# Patient Record
Sex: FEMALE | Race: WHITE | URBAN - METROPOLITAN AREA
[De-identification: names, ages, dates, MRNs, and addresses within clinical notes are randomized per-mention and may not be internally consistent; named-entity substitution may affect disease eponyms.]

---

## 2021-07-29 ENCOUNTER — OFFICE VISIT (OUTPATIENT)
Dept: PRIMARY CARE CLINIC | Age: 61
End: 2021-07-29
Payer: COMMERCIAL

## 2021-07-29 VITALS
OXYGEN SATURATION: 98 % | WEIGHT: 183.2 LBS | HEART RATE: 66 BPM | SYSTOLIC BLOOD PRESSURE: 106 MMHG | TEMPERATURE: 98.2 F | DIASTOLIC BLOOD PRESSURE: 70 MMHG | BODY MASS INDEX: 27.77 KG/M2 | HEIGHT: 68 IN

## 2021-07-29 DIAGNOSIS — I47.1 PSVT (PAROXYSMAL SUPRAVENTRICULAR TACHYCARDIA) (HCC): ICD-10-CM

## 2021-07-29 DIAGNOSIS — M21.612 BUNION OF GREAT TOE OF LEFT FOOT: ICD-10-CM

## 2021-07-29 DIAGNOSIS — Z00.00 ROUTINE ADULT HEALTH MAINTENANCE: ICD-10-CM

## 2021-07-29 DIAGNOSIS — R21 RASH: Primary | ICD-10-CM

## 2021-07-29 DIAGNOSIS — C50.911 MALIGNANT NEOPLASM OF RIGHT BREAST IN FEMALE, ESTROGEN RECEPTOR POSITIVE, UNSPECIFIED SITE OF BREAST (HCC): ICD-10-CM

## 2021-07-29 DIAGNOSIS — E55.9 VITAMIN D DEFICIENCY: ICD-10-CM

## 2021-07-29 DIAGNOSIS — Z17.0 MALIGNANT NEOPLASM OF RIGHT BREAST IN FEMALE, ESTROGEN RECEPTOR POSITIVE, UNSPECIFIED SITE OF BREAST (HCC): ICD-10-CM

## 2021-07-29 PROCEDURE — 99203 OFFICE O/P NEW LOW 30 MIN: CPT | Performed by: INTERNAL MEDICINE

## 2021-07-29 RX ORDER — METOPROLOL SUCCINATE 25 MG/1
25 TABLET, EXTENDED RELEASE ORAL DAILY
COMMUNITY
Start: 2020-10-08 | End: 2021-08-03 | Stop reason: SDUPTHER

## 2021-07-29 RX ORDER — CETIRIZINE HYDROCHLORIDE 10 MG/1
10 TABLET ORAL DAILY
COMMUNITY

## 2021-07-29 RX ORDER — CLOBETASOL PROPIONATE 0.5 MG/G
OINTMENT TOPICAL 2 TIMES DAILY
COMMUNITY

## 2021-07-29 RX ORDER — MECLIZINE HYDROCHLORIDE 25 MG/1
25 TABLET ORAL 3 TIMES DAILY PRN
COMMUNITY
Start: 2021-07-21

## 2021-07-29 RX ORDER — ONDANSETRON 4 MG/1
4 TABLET, FILM COATED ORAL EVERY 8 HOURS PRN
COMMUNITY
Start: 2021-02-14

## 2021-07-29 SDOH — ECONOMIC STABILITY: FOOD INSECURITY: WITHIN THE PAST 12 MONTHS, YOU WORRIED THAT YOUR FOOD WOULD RUN OUT BEFORE YOU GOT MONEY TO BUY MORE.: NEVER TRUE

## 2021-07-29 SDOH — ECONOMIC STABILITY: FOOD INSECURITY: WITHIN THE PAST 12 MONTHS, THE FOOD YOU BOUGHT JUST DIDN'T LAST AND YOU DIDN'T HAVE MONEY TO GET MORE.: NEVER TRUE

## 2021-07-29 ASSESSMENT — PATIENT HEALTH QUESTIONNAIRE - PHQ9
1. LITTLE INTEREST OR PLEASURE IN DOING THINGS: 0
2. FEELING DOWN, DEPRESSED OR HOPELESS: 0
SUM OF ALL RESPONSES TO PHQ QUESTIONS 1-9: 0
SUM OF ALL RESPONSES TO PHQ QUESTIONS 1-9: 0
SUM OF ALL RESPONSES TO PHQ9 QUESTIONS 1 & 2: 0
SUM OF ALL RESPONSES TO PHQ QUESTIONS 1-9: 0

## 2021-07-29 ASSESSMENT — SOCIAL DETERMINANTS OF HEALTH (SDOH): HOW HARD IS IT FOR YOU TO PAY FOR THE VERY BASICS LIKE FOOD, HOUSING, MEDICAL CARE, AND HEATING?: NOT HARD AT ALL

## 2021-07-29 NOTE — PATIENT INSTRUCTIONS
1. Explain to your dermatologist that you really need confidence before undertaking course of treatment, therefore w/u for contact dermatitis, KOH prep, fungal culture would be reasonable.      2. Oil of olay or dove unscented soap for soap, in wintertime moisturizing creams are better than lotions, eg Cerave cream.     3. Derm and [facility] referrals created

## 2021-08-03 NOTE — TELEPHONE ENCOUNTER
[address] [facility] [address] Clinical Staff  Subject: Message to Provider     QUESTIONS   Information for Provider? Pt appointment for her new cardiologist isn't until the end of September and her medication runs out in a week.  The pt is requesting Dr. Drake Rubin to refill this medication for 60 days until   she's able to see her new cardiologist. metoprolol succinate (TOPROL XL)   25 MG extended release tablet

## 2021-08-04 RX ORDER — METOPROLOL SUCCINATE 25 MG/1
25 TABLET, EXTENDED RELEASE ORAL DAILY
Qty: 30 TABLET | Refills: 1 | Status: SHIPPED | OUTPATIENT
Start: 2021-08-04

## 2021-08-04 NOTE — TELEPHONE ENCOUNTER
Patient checking the status of metoprolol succinate (TOPROL XL)   25 MG extended release tablet  prescription requested.   Please Advise

## 2021-08-16 ENCOUNTER — TELEPHONE (OUTPATIENT)
Dept: PRIMARY CARE CLINIC | Age: 61
End: 2021-08-16

## 2021-08-16 NOTE — TELEPHONE ENCOUNTER
----- Message from Charlygwen Morataya sent at 8/16/2021  7:57 AM EDT -----  Subject: Appointment Request    Reason for Call: Routine Existing Condition Follow Up    QUESTIONS  Type of Appointment? Established Patient  Reason for appointment request? No appointments available during search  Additional Information for Provider? Pt cancelled their appointment today   for 3 week f/u discuss labs please call to reschedule.  ---------------------------------------------------------------------------  --------------  [address]  What is the best way for the office to contact you? OK to leave message on   voicemail  Preferred Call Back Phone Number? [phone]  ---------------------------------------------------------------------------  --------------  SCRIPT ANSWERS  Relationship to Patient? Self  (Is the patient requesting to be seen urgently for their symptoms?)? No  Is this follow up request related to routine Diabetes Management? No  Are you having any new concerns about your existing condition? No  Have you been diagnosed with, awaiting test results for, or told that you   are suspected of having COVID-19 (Coronavirus)? (If patient has tested   negative or was tested as a requirement for work, school, or travel and   not based on symptoms, answer no)? No  Do you currently have flu-like symptoms including fever or chills, cough,   shortness of breath, difficulty breathing, or new loss of taste or smell? No  Have you had close contact with someone with COVID-19 in the last 14 days? No  (Service Expert  click yes below to proceed with [employer] As Usual   Scheduling)?  Yes

## 2024-01-11 ENCOUNTER — OFFICE VISIT (OUTPATIENT)
Dept: PRIMARY CARE CLINIC | Age: 64
End: 2024-01-11

## 2024-01-11 VITALS
TEMPERATURE: 97.6 F | RESPIRATION RATE: 16 BRPM | WEIGHT: 185 LBS | DIASTOLIC BLOOD PRESSURE: 76 MMHG | BODY MASS INDEX: 27.4 KG/M2 | OXYGEN SATURATION: 96 % | SYSTOLIC BLOOD PRESSURE: 126 MMHG | HEART RATE: 76 BPM | HEIGHT: 69 IN

## 2024-01-11 DIAGNOSIS — H62.43 OTOMYCOSIS OF BOTH EARS: Primary | ICD-10-CM

## 2024-01-11 DIAGNOSIS — B36.9 OTITIS EXTERNA, FUNGAL, BOTH EARS: ICD-10-CM

## 2024-01-11 DIAGNOSIS — B36.9 OTOMYCOSIS OF BOTH EARS: Primary | ICD-10-CM

## 2024-01-11 DIAGNOSIS — I47.19 AVNRT (AV NODAL RE-ENTRY TACHYCARDIA): ICD-10-CM

## 2024-01-11 DIAGNOSIS — R73.9 HYPERGLYCEMIA: ICD-10-CM

## 2024-01-11 DIAGNOSIS — H62.43 OTITIS EXTERNA, FUNGAL, BOTH EARS: ICD-10-CM

## 2024-01-11 DIAGNOSIS — B35.3 TINEA PEDIS OF BOTH FEET: ICD-10-CM

## 2024-01-11 DIAGNOSIS — C88.4 MALT LYMPHOMA (HCC): ICD-10-CM

## 2024-01-11 DIAGNOSIS — Z12.31 ENCOUNTER FOR SCREENING MAMMOGRAM FOR MALIGNANT NEOPLASM OF BREAST: ICD-10-CM

## 2024-01-11 DIAGNOSIS — H60.8X3 CHRONIC ECZEMATOUS OTITIS EXTERNA OF BOTH EARS: ICD-10-CM

## 2024-01-11 DIAGNOSIS — I47.10 PSVT (PAROXYSMAL SUPRAVENTRICULAR TACHYCARDIA): ICD-10-CM

## 2024-01-11 DIAGNOSIS — Z85.3 HX OF BREAST CANCER: ICD-10-CM

## 2024-01-11 DIAGNOSIS — E78.2 HYPERLIPIDEMIA, MIXED: ICD-10-CM

## 2024-01-11 DIAGNOSIS — E04.1 THYROID NODULE: ICD-10-CM

## 2024-01-11 DIAGNOSIS — B35.3 TINEA MANUUM, PEDIS, AND UNGUIUM: ICD-10-CM

## 2024-01-11 DIAGNOSIS — B35.1 TINEA MANUUM, PEDIS, AND UNGUIUM: ICD-10-CM

## 2024-01-11 DIAGNOSIS — B35.2 TINEA MANUUM, PEDIS, AND UNGUIUM: ICD-10-CM

## 2024-01-11 LAB
ALBUMIN SERPL-MCNC: 4.5 G/DL (ref 3.4–5)
ALBUMIN/GLOB SERPL: 1.6 {RATIO} (ref 1.1–2.2)
ALP SERPL-CCNC: 105 U/L (ref 40–129)
ALT SERPL-CCNC: 19 U/L (ref 10–40)
ANION GAP SERPL CALCULATED.3IONS-SCNC: 9 MMOL/L (ref 3–16)
AST SERPL-CCNC: 21 U/L (ref 15–37)
BASOPHILS # BLD: 0 K/UL (ref 0–0.2)
BASOPHILS NFR BLD: 0.6 %
BILIRUB SERPL-MCNC: 0.4 MG/DL (ref 0–1)
BUN SERPL-MCNC: 16 MG/DL (ref 7–20)
CALCIUM SERPL-MCNC: 10.5 MG/DL (ref 8.3–10.6)
CHLORIDE SERPL-SCNC: 104 MMOL/L (ref 99–110)
CHOLEST SERPL-MCNC: 208 MG/DL (ref 0–199)
CO2 SERPL-SCNC: 27 MMOL/L (ref 21–32)
CREAT SERPL-MCNC: 0.8 MG/DL (ref 0.6–1.2)
DEPRECATED RDW RBC AUTO: 13.1 % (ref 12.4–15.4)
EOSINOPHIL # BLD: 0.3 K/UL (ref 0–0.6)
EOSINOPHIL NFR BLD: 4.2 %
GFR SERPLBLD CREATININE-BSD FMLA CKD-EPI: >60 ML/MIN/{1.73_M2}
GLUCOSE SERPL-MCNC: 101 MG/DL (ref 70–99)
HCT VFR BLD AUTO: 43.9 % (ref 36–48)
HDLC SERPL-MCNC: 36 MG/DL (ref 40–60)
HGB BLD-MCNC: 14.4 G/DL (ref 12–16)
LDLC SERPL CALC-MCNC: 124 MG/DL
LYMPHOCYTES # BLD: 2.2 K/UL (ref 1–5.1)
LYMPHOCYTES NFR BLD: 31.6 %
MCH RBC QN AUTO: 27.9 PG (ref 26–34)
MCHC RBC AUTO-ENTMCNC: 32.9 G/DL (ref 31–36)
MCV RBC AUTO: 84.8 FL (ref 80–100)
MONOCYTES # BLD: 0.7 K/UL (ref 0–1.3)
MONOCYTES NFR BLD: 9.7 %
NEUTROPHILS # BLD: 3.7 K/UL (ref 1.7–7.7)
NEUTROPHILS NFR BLD: 53.9 %
PLATELET # BLD AUTO: 313 K/UL (ref 135–450)
PMV BLD AUTO: 9.4 FL (ref 5–10.5)
POTASSIUM SERPL-SCNC: 4.7 MMOL/L (ref 3.5–5.1)
PROT SERPL-MCNC: 7.3 G/DL (ref 6.4–8.2)
RBC # BLD AUTO: 5.18 M/UL (ref 4–5.2)
SODIUM SERPL-SCNC: 140 MMOL/L (ref 136–145)
TRIGL SERPL-MCNC: 242 MG/DL (ref 0–150)
TSH SERPL DL<=0.005 MIU/L-ACNC: 1.98 UIU/ML (ref 0.27–4.2)
VLDLC SERPL CALC-MCNC: 48 MG/DL
WBC # BLD AUTO: 7 K/UL (ref 4–11)

## 2024-01-11 RX ORDER — METOPROLOL SUCCINATE 25 MG/1
25 TABLET, EXTENDED RELEASE ORAL DAILY
Qty: 90 TABLET | Refills: 1 | Status: SHIPPED | OUTPATIENT
Start: 2024-01-11

## 2024-01-11 RX ORDER — TERBINAFINE HYDROCHLORIDE 250 MG/1
TABLET ORAL
COMMUNITY
Start: 2023-12-29

## 2024-01-11 RX ORDER — IBUPROFEN 800 MG/1
800 TABLET ORAL 3 TIMES DAILY
COMMUNITY
Start: 2023-05-16

## 2024-01-11 RX ORDER — TERBINAFINE HYDROCHLORIDE 250 MG/1
TABLET ORAL
Status: CANCELLED | OUTPATIENT
Start: 2024-01-11

## 2024-01-11 SDOH — ECONOMIC STABILITY: FOOD INSECURITY: WITHIN THE PAST 12 MONTHS, THE FOOD YOU BOUGHT JUST DIDN'T LAST AND YOU DIDN'T HAVE MONEY TO GET MORE.: NEVER TRUE

## 2024-01-11 SDOH — ECONOMIC STABILITY: INCOME INSECURITY: HOW HARD IS IT FOR YOU TO PAY FOR THE VERY BASICS LIKE FOOD, HOUSING, MEDICAL CARE, AND HEATING?: NOT HARD AT ALL

## 2024-01-11 SDOH — ECONOMIC STABILITY: HOUSING INSECURITY
IN THE LAST 12 MONTHS, WAS THERE A TIME WHEN YOU DID NOT HAVE A STEADY PLACE TO SLEEP OR SLEPT IN A SHELTER (INCLUDING NOW)?: NO

## 2024-01-11 SDOH — ECONOMIC STABILITY: FOOD INSECURITY: WITHIN THE PAST 12 MONTHS, YOU WORRIED THAT YOUR FOOD WOULD RUN OUT BEFORE YOU GOT MONEY TO BUY MORE.: NEVER TRUE

## 2024-01-11 ASSESSMENT — PATIENT HEALTH QUESTIONNAIRE - PHQ9
SUM OF ALL RESPONSES TO PHQ9 QUESTIONS 1 & 2: 0
2. FEELING DOWN, DEPRESSED OR HOPELESS: 0
1. LITTLE INTEREST OR PLEASURE IN DOING THINGS: 0
SUM OF ALL RESPONSES TO PHQ QUESTIONS 1-9: 0

## 2024-01-11 NOTE — PATIENT INSTRUCTIONS
Get labs done today    Call [phone] to schedule your mammogram and thyroid ultrasound    Schedule to get in with Elsa Jackson    Call to schedule with Podiatry and ENT:  [facility] - Sadaf Mathews MD  [address]  [place] [address]  Ph: [phone]

## 2024-01-11 NOTE — PROGRESS NOTES
(paroxysmal supraventricular tachycardia)  Assessment & Plan:  Stable, rate regular and wnl today, pt asx as long as she takes metoprolol  -Refill metoprolol XL 25mg dailyuntil pt f/u with cardiology (EP), pt plans to return to see them now that insurance has been straightened out    10. Hyperlipidemia, mixed  Assessment & Plan:  Unclear  -Recheck lipid panel today  Orders:  -     LIPID PANEL; Future  11. Hyperglycemia  Assessment & Plan:  Check A1c today  Orders:  -     Hemoglobin A1C; Future       Return in about 3 weeks (around 2/1/2024) for rash.    Patient received counseling and, if relevant, printed instructions for symptoms/diagnoses listed documented in today's visit note. Typical counseling includes, but is not limited to, non-pharmacologic measures to manage listed symptoms and conditions; appropriate use, risks and benefits for all prescribed medications; potential interactions between medications both prescribed and OTC; diet; exercise; healthy behaviors; and goalsetting to improve health. Patient or responsible party was involved in shared decision making and had opportunity to have all questions answered.    For any lab/imaging orders as above, advised pt to go to complete these orders today/ASAP. Discussed need for lab monitoring for patient's safety and safe medication prescribing, pt agreeable and states they will complete today/ASAP.    Electronically signed by Trudy Suazo MD on 1/11/2024 at 10:40 AM.

## 2024-01-12 LAB
EST. AVERAGE GLUCOSE BLD GHB EST-MCNC: 119.8 MG/DL
HBA1C MFR BLD: 5.8 %

## 2024-01-12 NOTE — RESULT ENCOUNTER NOTE
Please call patient to relay the following results:    -Normal kidneys, liver, thyroid, electrolytes, and blood counts  -blood sugar testing, or A1c is mildly in the \"prediabetic\" range at 5.8  -cholesterol is mild-moderately elevated. We use this, and your blood pressure to calculate your 10-year risk of having a stroke or heart attack. Your risk comes out to be about 5.6%. We consider starting a medicine for cholesterol once this risk gets above 7.5%. To prevent this, continue to work on healthy nutrition and activity in your life. We can discuss this further at your next visit with me.    Feel free to mychart/call with any questions in the meantime.    Dr. Suazo

## 2024-01-14 PROBLEM — E78.2 HYPERLIPIDEMIA, MIXED: Status: ACTIVE | Noted: 2024-01-14

## 2024-01-14 PROBLEM — H62.43 OTITIS EXTERNA, FUNGAL, BOTH EARS: Status: ACTIVE | Noted: 2024-01-14

## 2024-01-14 PROBLEM — E04.1 THYROID NODULE: Chronic | Status: ACTIVE | Noted: 2024-01-14

## 2024-01-14 PROBLEM — E04.1 THYROID NODULE: Status: ACTIVE | Noted: 2024-01-14

## 2024-01-14 PROBLEM — C88.40 MALT LYMPHOMA: Status: ACTIVE | Noted: 2024-01-14

## 2024-01-14 PROBLEM — B35.1 TINEA MANUUM, PEDIS, AND UNGUIUM: Status: ACTIVE | Noted: 2024-01-14

## 2024-01-14 PROBLEM — B36.9 OTITIS EXTERNA, FUNGAL, BOTH EARS: Status: ACTIVE | Noted: 2024-01-14

## 2024-01-14 PROBLEM — B35.3 TINEA PEDIS OF BOTH FEET: Status: ACTIVE | Noted: 2024-01-14

## 2024-01-14 PROBLEM — B35.2 TINEA MANUUM, PEDIS, AND UNGUIUM: Status: ACTIVE | Noted: 2024-01-14

## 2024-01-14 PROBLEM — Z85.3 HX OF BREAST CANCER: Status: ACTIVE | Noted: 2024-01-14

## 2024-01-14 PROBLEM — R73.9 HYPERGLYCEMIA: Status: ACTIVE | Noted: 2024-01-14

## 2024-01-14 PROBLEM — I47.19 AVNRT (AV NODAL RE-ENTRY TACHYCARDIA): Status: ACTIVE | Noted: 2024-01-14

## 2024-01-14 PROBLEM — C88.4 MALT LYMPHOMA (HCC): Status: ACTIVE | Noted: 2024-01-14

## 2024-01-14 PROBLEM — I47.10 PSVT (PAROXYSMAL SUPRAVENTRICULAR TACHYCARDIA): Status: ACTIVE | Noted: 2024-01-14

## 2024-01-16 RX ORDER — TERBINAFINE HYDROCHLORIDE 250 MG/1
250 TABLET ORAL DAILY
Qty: 7 TABLET | Refills: 0 | Status: SHIPPED | OUTPATIENT
Start: 2024-01-16 | End: 2024-01-23

## 2024-01-17 ENCOUNTER — HOSPITAL ENCOUNTER (OUTPATIENT)
Dept: WOMENS IMAGING | Age: 64
Discharge: HOME | End: 2024-01-17
Attending: FAMILY MEDICINE
Payer: COMMERCIAL

## 2024-01-17 ENCOUNTER — HOSPITAL ENCOUNTER (OUTPATIENT)
Dept: ULTRASOUND IMAGING | Age: 64
Discharge: HOME | End: 2024-01-17
Attending: FAMILY MEDICINE
Payer: COMMERCIAL

## 2024-01-17 VITALS — HEIGHT: 69 IN | WEIGHT: 180 LBS | BODY MASS INDEX: 26.66 KG/M2

## 2024-01-17 DIAGNOSIS — Z12.31 ENCOUNTER FOR SCREENING MAMMOGRAM FOR MALIGNANT NEOPLASM OF BREAST: ICD-10-CM

## 2024-01-17 DIAGNOSIS — Z85.3 HX OF BREAST CANCER: ICD-10-CM

## 2024-01-17 DIAGNOSIS — C88.4 MALT LYMPHOMA (HCC): ICD-10-CM

## 2024-01-17 DIAGNOSIS — E04.1 THYROID NODULE: ICD-10-CM

## 2024-01-17 PROCEDURE — 77063 BREAST TOMOSYNTHESIS BI: CPT

## 2024-01-17 PROCEDURE — 76536 US EXAM OF HEAD AND NECK: CPT

## 2024-01-22 ENCOUNTER — OFFICE VISIT (OUTPATIENT)
Dept: ENT CLINIC | Age: 64
End: 2024-01-22
Payer: COMMERCIAL

## 2024-01-22 VITALS
HEART RATE: 77 BPM | BODY MASS INDEX: 27.55 KG/M2 | SYSTOLIC BLOOD PRESSURE: 135 MMHG | TEMPERATURE: 97.3 F | WEIGHT: 186 LBS | RESPIRATION RATE: 16 BRPM | HEIGHT: 69 IN | DIASTOLIC BLOOD PRESSURE: 73 MMHG

## 2024-01-22 DIAGNOSIS — E04.1 THYROID NODULE: Primary | ICD-10-CM

## 2024-01-22 PROCEDURE — 99204 OFFICE O/P NEW MOD 45 MIN: CPT | Performed by: OTOLARYNGOLOGY

## 2024-01-22 RX ORDER — ASPIRIN 81 MG/1
81 TABLET ORAL DAILY
COMMUNITY

## 2024-01-27 DIAGNOSIS — E21.5 PARATHYROID ABNORMALITY (HCC): Primary | ICD-10-CM

## 2024-01-29 ENCOUNTER — TELEPHONE (OUTPATIENT)
Dept: ENT CLINIC | Age: 64
End: 2024-01-29

## 2024-01-29 NOTE — TELEPHONE ENCOUNTER
Patient called in regards to results on her ultrasound and hadn't heard from [facility] . Mentioned that the results went her PCP , ultrasound done 1/27/24 can you look into this please and give your opinion . Requested by patient

## 2024-01-31 ENCOUNTER — TELEPHONE (OUTPATIENT)
Dept: PRIMARY CARE CLINIC | Age: 64
End: 2024-01-31

## 2024-01-31 RX ORDER — TERBINAFINE HYDROCHLORIDE 250 MG/1
250 TABLET ORAL DAILY
Qty: 7 TABLET | Refills: 0 | Status: SHIPPED | OUTPATIENT
Start: 2024-01-31 | End: 2024-02-07

## 2024-01-31 NOTE — TELEPHONE ENCOUNTER
----- Message from Kae Schuler sent at 1/30/2024  4:50 PM EST -----  Subject: Refill Request    QUESTIONS  Name of Medication? terbinafine (LAMISIL) 250 MG tablet  Patient-reported dosage and instructions? 1 tablet once a day  How many days do you have left? 5  Preferred Pharmacy? [id number]  Pharmacy phone number (if available)? [phone]  ---------------------------------------------------------------------------  --------------  CALL BACK INFO  What is the best way for the office to contact you? OK to leave message on   voicemail  Preferred Call Back Phone Number? [phone]  ---------------------------------------------------------------------------  --------------  SCRIPT ANSWERS  Relationship to Patient? Self

## 2024-01-31 NOTE — TELEPHONE ENCOUNTER
Medication:   Requested Prescriptions     Pending Prescriptions Disp Refills    terbinafine (LAMISIL) 250 MG tablet 7 tablet 0     Sig: Take 1 tablet by mouth daily for 7 days        Last Filled:      Patient Phone Number: [phone] (home)     Last appt: 1/11/2024   Next appt: 2/29/2024    Last OARRS:        No data to display

## 2024-02-07 DIAGNOSIS — E21.5 PARATHYROID ABNORMALITY (HCC): ICD-10-CM

## 2024-02-07 LAB — PTH-INTACT SERPL-MCNC: 75.4 PG/ML (ref 14–72)

## 2024-02-09 ENCOUNTER — OFFICE VISIT (OUTPATIENT)
Dept: ENT CLINIC | Age: 64
End: 2024-02-09

## 2024-02-09 VITALS
HEIGHT: 69 IN | DIASTOLIC BLOOD PRESSURE: 70 MMHG | WEIGHT: 184 LBS | RESPIRATION RATE: 16 BRPM | SYSTOLIC BLOOD PRESSURE: 118 MMHG | HEART RATE: 87 BPM | BODY MASS INDEX: 27.25 KG/M2 | TEMPERATURE: 97.3 F

## 2024-02-09 DIAGNOSIS — E21.3 HYPERPARATHYROIDISM (HCC): ICD-10-CM

## 2024-02-09 DIAGNOSIS — E04.2 MULTINODULAR GOITER: ICD-10-CM

## 2024-02-09 DIAGNOSIS — E04.1 THYROID NODULE: Primary | ICD-10-CM

## 2024-02-09 DIAGNOSIS — E83.52 HYPERCALCEMIA: ICD-10-CM

## 2024-02-09 LAB
CA-I ADJ PH7.4 SERPL-SCNC: 1.55 MMOL/L (ref 1.09–1.3)
CA-I SERPL ISE-SCNC: 1.52 MMOL/L (ref 1.09–1.3)

## 2024-02-09 ASSESSMENT — ENCOUNTER SYMPTOMS
SHORTNESS OF BREATH: 0
BLOOD IN STOOL: 0
SORE THROAT: 0
FACIAL SWELLING: 0
NAUSEA: 0
COLOR CHANGE: 0
BACK PAIN: 0
CONSTIPATION: 0
TROUBLE SWALLOWING: 0
SINUS PAIN: 0
PHOTOPHOBIA: 0
SINUS PRESSURE: 0
WHEEZING: 0
RHINORRHEA: 0
VOICE CHANGE: 0
COUGH: 0
CHOKING: 0
DIARRHEA: 0
VOMITING: 0
EYE ITCHING: 0
STRIDOR: 0
EYE DISCHARGE: 0

## 2024-02-09 NOTE — RESULT ENCOUNTER NOTE
Called patient, reviewed results: elevated calcium and PTH  Pt notes just had thyroid biopsy today, schedule parathyroid NM scan next week    Pt notes foot scraping was negative for fungus, has been using topical ammonium lactate and now feet are much better as well. Ears are also better since seeing ENT.    Dr. Suazo

## 2024-02-09 NOTE — PROGRESS NOTES
[facility]  [address] , [address]  [place] [address]  P: [phone]  F: [phone]       Patient     Mckayla Trejo  1960    ChiefComplaint     Chief Complaint   Patient presents with    Other     procedure       History of Present Illness     Mckayla Trejo is a pleasant 63 y.o. female referred to me by my partner for thyroid nodule.  Patient underwent ultrasound of the thyroid January 17, 2024.  Ultrasound reveals a right-sided 16 x 12 x 7 mm nodule and a right-sided 21 x 16 x 10 mm nodule.  About 10 years ago, she had a solitary right-sided nodule that was biopsied.  Results were not available for review.  There is no family history of thyroid cancer.  She did have recent radiation to her right breast in 2020.    Additionally, on ultrasound there is concern that the right posterior nodule may be a parathyroid adenoma.  Her PTH and calcium levels are elevated.    Past Medical History     Past Medical History:   Diagnosis Date    Asthma     Breast cancer (HCC)     Cancer (HCC)     right breast    History of therapeutic radiation     Hypertension        Past Surgical History     Past Surgical History:   Procedure Laterality Date    BREAST LUMPECTOMY Right 02/01/2020    BREAST REDUCTION SURGERY Left 12/28/2020    HYSTERECTOMY, VAGINAL      MYOMECTOMY         Family History     Family History   Problem Relation Age of Onset    Arthritis Mother     Diabetes Father     Heart Disease Father        Social History     Social History     Socioeconomic History    Marital status:      Spouse name: Not on file    Number of children: Not on file    Years of education: Not on file    Highest education level: Not on file   Occupational History    Not on file   Tobacco Use    Smoking status: Former     Types: Cigarettes    Smokeless tobacco: Never   Vaping Use    Vaping Use: Never used   Substance and Sexual Activity    Alcohol use: Never    Drug use: Never    Sexual activity: Not Currently

## 2024-02-10 LAB — CALCIT SERPL-MCNC: <2 PG/ML (ref 0–5.1)

## 2024-02-16 ENCOUNTER — HOSPITAL ENCOUNTER (OUTPATIENT)
Dept: NUCLEAR MEDICINE | Age: 64
Discharge: HOME | End: 2024-02-16
Payer: COMMERCIAL

## 2024-02-16 DIAGNOSIS — E83.52 HYPERCALCEMIA: ICD-10-CM

## 2024-02-16 DIAGNOSIS — E21.3 HYPERPARATHYROIDISM (HCC): ICD-10-CM

## 2024-02-16 PROCEDURE — 3430000000 HC RX DIAGNOSTIC RADIOPHARMACEUTICAL: Performed by: OTOLARYNGOLOGY

## 2024-02-16 PROCEDURE — A9500 TC99M SESTAMIBI: HCPCS | Performed by: OTOLARYNGOLOGY

## 2024-02-16 PROCEDURE — 78072 PARATHYRD PLANAR W/SPECT&CT: CPT

## 2024-02-16 RX ORDER — TETRAKIS(2-METHOXYISOBUTYLISOCYANIDE)COPPER(I) TETRAFLUOROBORATE 1 MG/ML
26 INJECTION, POWDER, LYOPHILIZED, FOR SOLUTION INTRAVENOUS
Status: COMPLETED | OUTPATIENT
Start: 2024-02-16 | End: 2024-02-16

## 2024-02-16 RX ADMIN — TETRAKIS(2-METHOXYISOBUTYLISOCYANIDE)COPPER(I) TETRAFLUOROBORATE 26 MILLICURIE: 1 INJECTION, POWDER, LYOPHILIZED, FOR SOLUTION INTRAVENOUS at 08:00

## 2024-02-28 ENCOUNTER — OFFICE VISIT (OUTPATIENT)
Dept: ENT CLINIC | Age: 64
End: 2024-02-28
Payer: COMMERCIAL

## 2024-02-28 VITALS
TEMPERATURE: 97.3 F | HEIGHT: 69 IN | HEART RATE: 93 BPM | BODY MASS INDEX: 27.4 KG/M2 | WEIGHT: 185 LBS | RESPIRATION RATE: 16 BRPM | DIASTOLIC BLOOD PRESSURE: 86 MMHG | SYSTOLIC BLOOD PRESSURE: 144 MMHG

## 2024-02-28 DIAGNOSIS — E83.52 HYPERCALCEMIA: ICD-10-CM

## 2024-02-28 DIAGNOSIS — D35.1 PARATHYROID ADENOMA: Primary | ICD-10-CM

## 2024-02-28 DIAGNOSIS — D49.2 NEOPLASM OF SKIN OF FACE: ICD-10-CM

## 2024-02-28 DIAGNOSIS — E04.1 THYROID NODULE: ICD-10-CM

## 2024-02-28 DIAGNOSIS — E21.3 HYPERPARATHYROIDISM (HCC): ICD-10-CM

## 2024-02-28 PROCEDURE — 31575 DIAGNOSTIC LARYNGOSCOPY: CPT | Performed by: OTOLARYNGOLOGY

## 2024-02-28 PROCEDURE — 99214 OFFICE O/P EST MOD 30 MIN: CPT | Performed by: OTOLARYNGOLOGY

## 2024-02-28 ASSESSMENT — ENCOUNTER SYMPTOMS
SORE THROAT: 0
PHOTOPHOBIA: 0
NAUSEA: 0
EYE REDNESS: 0
VOICE CHANGE: 0
COLOR CHANGE: 0
CHOKING: 0
TROUBLE SWALLOWING: 0
STRIDOR: 0
SINUS PRESSURE: 0
FACIAL SWELLING: 0
COUGH: 0
EYE ITCHING: 0
SINUS PAIN: 0
RHINORRHEA: 0
EYE PAIN: 0
SHORTNESS OF BREATH: 0
DIARRHEA: 0

## 2024-02-28 NOTE — PROGRESS NOTES
[facility]  [address] , [address]  [place] [address]  P: [phone]  F: [phone]       Patient     Mckayla Trejo  1960    ChiefComplaint     Chief Complaint   Patient presents with    Other     Talk to you about surgery       History of Present Illness     Mckayla Trejo is a pleasant 63 y.o. female is here today to review the results of her recent sestamibi scan.  Scan reveals increased uptake in the right posterior thyroid bed which corresponds with the recent ultrasound findings of a suspicious right parathyroid adenoma.  Patient has elevated PTH and calcium levels.  She is here today to discuss management.    Additionally, her recent thyroid FNA revealed benign nodule.    Past Medical History     Past Medical History:   Diagnosis Date    Asthma     Breast cancer (HCC)     Cancer (HCC)     right breast    History of therapeutic radiation     Hypertension        Past Surgical History     Past Surgical History:   Procedure Laterality Date    BREAST LUMPECTOMY Right 02/01/2020    BREAST REDUCTION SURGERY Left 12/28/2020    HYSTERECTOMY, VAGINAL      MYOMECTOMY         Family History     Family History   Problem Relation Age of Onset    Arthritis Mother     Diabetes Father     Heart Disease Father        Social History     Social History     Socioeconomic History    Marital status:      Spouse name: Not on file    Number of children: Not on file    Years of education: Not on file    Highest education level: Not on file   Occupational History    Not on file   Tobacco Use    Smoking status: Former     Types: Cigarettes    Smokeless tobacco: Never   Vaping Use    Vaping Use: Never used   Substance and Sexual Activity    Alcohol use: Never    Drug use: Never    Sexual activity: Not Currently     Partners: Male   Other Topics Concern    Not on file   Social History Narrative    Not on file     Social Determinants of Health     Financial Resource Strain: Low Risk  (1/11/2024)    
Statement Selected

## 2024-02-29 ENCOUNTER — PREP FOR PROCEDURE (OUTPATIENT)
Dept: ENT CLINIC | Age: 64
End: 2024-02-29

## 2024-02-29 DIAGNOSIS — E21.3 HPTH (HYPERPARATHYROIDISM) (HCC): ICD-10-CM

## 2024-02-29 DIAGNOSIS — E83.52 HYPERCALCEMIA: ICD-10-CM

## 2024-02-29 DIAGNOSIS — D49.2 NEOPLASM OF SKIN OF FACE: ICD-10-CM

## 2024-02-29 DIAGNOSIS — D35.1 PARATHYROID ADENOMA: ICD-10-CM

## 2024-03-11 RX ORDER — KETOCONAZOLE 20 MG/G
CREAM TOPICAL DAILY
Status: ON HOLD | COMMUNITY
Start: 2024-01-18 | End: 2024-03-18 | Stop reason: ALTCHOICE

## 2024-03-11 RX ORDER — AMMONIUM LACTATE 12 G/100G
1 CREAM TOPICAL
COMMUNITY
Start: 2024-01-18

## 2024-03-11 NOTE — PROGRESS NOTES
3/11/2024 1446 PM:      [facility] PRE-SURGICAL TESTING INSTRUCTIONS                      PRIOR TO PROCEDURE DATE:    1. PLEASE FOLLOW ANY INSTRUCTIONS GIVEN TO YOU PER YOUR SURGEON.      2. Arrange for someone to drive you home and be with you for the first 24 hours after discharge for your safety after your procedure for which you received sedation. Ensure it is someone we can share information with regarding your discharge.     NOTE: At this time ONLY 2 ADULTS may accompany you. NO CHILDREN UNDER AGE OF 16.    One person ENCOURAGED to stay at hospital entire time if outpatient surgery      3. You must contact your surgeon for instructions IF:  You are taking any blood thinners, aspirin, anti-inflammatory or vitamins.   Contact your ordering physician/surgeon for medication instructions as soon as possible, especially if taking blood thinners, aspirin, heart, or diabetic medication. STOP SUPPLEMENTS/VITAMINS/NON-STEROIDAL ANTI-INFLAMMATORY MEDICATION 7 DAYS PRIOR TO PROCEDURE.  There is a change in your physical condition such as a cold, fever, rash, cuts, sores, or any other infection, especially near your surgical site.    4. Do not drink alcohol the day before or day of your procedure.  Do not use any recreational marijuana at least 24 hours or street drugs (heroin, cocaine) at minimum 5 days prior to your procedure.     5. A Pre-Surgical History and Physical MUST be completed WITHIN 30 DAYS OR LESS prior to your procedure.by your Physician or an Urgent Care. PT VERBALIZED UNDERSTANDING H&P REQUIRED FOR PROCEDURE.         THE DAY OF YOUR PROCEDURE:  1.  Follow instructions for ARRIVAL TIME as DIRECTED BY YOUR SURGEON. [address] [address]     2. Enter the MAIN entrance from [facility] and follow the signs to the free Parking Garage or  Parking (offered free of charge 7 am-5pm).      3. Enter the Main Entrance of the hospital (do not enter from the lower  level of the parking garage). Upon entrance, check in with the  at the surgical information desk on your LEFT.   Bring your insurance card and photo ID to register      4. DO NOT EAT ANYTHING 8 hours prior to arrival for surgery.  You may have up to 8 ounces of water 4 hours prior to your arrival for surgery.   NOTE: ALL Gastric, Bariatric & Bowel surgery patients - you MUST follow your surgeon's instructions regarding eating/ drinking as you will have very specific instructions to follow.  If you did not receive these, call your surgeon's office immediately.     5. MEDICATIONS:  The Day of procedure ONLY Take the following medications with a SMALL sip of water: METOPROLOL  INSTRUCTED TO STOP/HOLD VITAMINS/SUPPLEMENTS/HERBAL SUPPLEMENTS/ANTI-INFLAMMATORY MEDICATION, 7 DAYS PRIOR TO PROCEDURE    INSTRUCTED TO CONTACT DOCTOR AND FOLLOW INSTRUCTIONS FOR WHEN TO STOP/HOLD BLOOD THINNERS-INCLUDING ASPIRIN   Use your usual dose of inhalers the morning of surgery. BRING your rescue inhaler with you to hospital.   Anesthesia does NOT want you to take insulin the morning of surgery. They will control your blood sugar while you are at the hospital. Please contact your ordering physician for instructions regarding your insulin the night before your procedure. If you have an insulin pump, please keep it set on basal rate.   Bariatric patient's call your surgeon if on diabetic medications as some may need to be stopped 1 week prior to surgery    6. Do not swallow additional water when brushing teeth. No gum, candy, mints, or ice chips. Refrain from smoking or at least decrease the amount on day of surgery.    7. Morning of surgery:   Take a shower with an antibacterial soap (i.e., Safeguard or Dial) OR your physician may have instructed you to use Hibiclens.  Dress in loose, comfortable clothing appropriate for redressing after your procedure.   Do not wear jewelry (including body piercings), make-up (especially NO

## 2024-03-11 NOTE — PROGRESS NOTES
3/11/2024 0831 AM:  PRE-OP INSTRUCTIONS FOR SURGICAL PATIENTS          Our Pre-admission Testing Nurses tried and were unable to reach you today.  Please read the attached instructions AND listen to your voicemail. PLEASE CALL [phone].    Follow all instructions provided to you from your surgeon's office, including your ARRIVAL TIME.   Arrange for someone to drive you home and be with you for the first 24 hours after discharge.     NOTE: at this time ONLY 2 ADULTS may accompany you. NO CHILDREN UNDER THE AGE OF 16.    One person encouraged to stay at hospital entire time if outpatient surgery  Enter the MAIN entrance located on [address] and report to the surgical desk on the LEFT side of the lobby. Please park in the parking garage or there is free  Parking available after 7am for your use.    Bring your insurance card & photo ID with you to register.  Bring your medication list with you with dose and frequency listed (including over the counter medications)  Contact your ordering physician/surgeon for medication instructions as soon as possible, especially if taking blood thinners, aspirin, heart, or diabetic medication.   STOP VITAMINS/SUPPLEMENTS/NON-STEROIDAL ANTI-INFLAMMATORY MEDICATIONS 7 DAYS PRIOR TO PROCEDURE.   Bariatric surgical patients need to call your surgeon if on diabetic medications (as some may need to be stopped 1-week preop)  A Pre-Surgical History and Physical MUST be completed WITHIN 30 DAYS OR LESS prior to your procedure by your Physician or an Urgent Care.  DO NOT EAT ANYTHING 8 hours prior to arrival for surgery.  You may have sips of WATER ONLY (up to 8 ounces) 4 hours prior to your arrival for surgery. Then nothing further 4 hours prior to arriving at hospital.   NOTE: ALL Gastric, Bariatric & Bowel surgery patients - you MUST follow your surgeon's instructions regarding eating/drinking as you will have very specific instructions to follow.  If you did not receive these,

## 2024-03-11 NOTE — PROGRESS NOTES
3/11/24 0759 AM  [facility] PHYSICIAN. PER [facility] AUDIT TRAIL, PROCEDURE PLACED ON [facility] SURGERY SCHEDULE  2/29/24. PRE-OP ORDERS/CONSENT TO BE ENTERED BY SURGEON'S OFFICE/TS.

## 2024-03-11 NOTE — PROGRESS NOTES
3/11/2024 0831 AM:  LMOR W/INSTRUCTIONS AND NEEDS H&P/TS    3/11/2024 1448 PM:  TI COMPLETED/TS    PT VERBALIZED UNDERSTANDING H&P NEEDED FOR PROCEDURE/TS

## 2024-03-14 ENCOUNTER — HOSPITAL ENCOUNTER (OUTPATIENT)
Age: 64
Discharge: HOME | End: 2024-03-14
Payer: COMMERCIAL

## 2024-03-14 PROCEDURE — 93005 ELECTROCARDIOGRAM TRACING: CPT | Performed by: STUDENT IN AN ORGANIZED HEALTH CARE EDUCATION/TRAINING PROGRAM

## 2024-03-15 ENCOUNTER — TELEPHONE (OUTPATIENT)
Dept: ENT CLINIC | Age: 64
End: 2024-03-15

## 2024-03-15 ENCOUNTER — ANESTHESIA EVENT (OUTPATIENT)
Dept: OPERATING ROOM | Age: 64
End: 2024-03-15
Payer: COMMERCIAL

## 2024-03-15 LAB
EKG ATRIAL RATE: 68 BPM
EKG DIAGNOSIS: NORMAL
EKG P AXIS: 14 DEGREES
EKG P-R INTERVAL: 184 MS
EKG Q-T INTERVAL: 388 MS
EKG QRS DURATION: 118 MS
EKG QTC CALCULATION (BAZETT): 412 MS
EKG R AXIS: -4 DEGREES
EKG T AXIS: 24 DEGREES
EKG VENTRICULAR RATE: 68 BPM

## 2024-03-15 RX ORDER — SODIUM CHLORIDE 0.9 % (FLUSH) 0.9 %
5-40 SYRINGE (ML) INJECTION PRN
Status: CANCELLED | OUTPATIENT
Start: 2024-03-18

## 2024-03-15 RX ORDER — SODIUM CHLORIDE 0.9 % (FLUSH) 0.9 %
5-40 SYRINGE (ML) INJECTION EVERY 12 HOURS SCHEDULED
Status: CANCELLED | OUTPATIENT
Start: 2024-03-18

## 2024-03-15 RX ORDER — SODIUM CHLORIDE 9 MG/ML
INJECTION, SOLUTION INTRAVENOUS PRN
Status: CANCELLED | OUTPATIENT
Start: 2024-03-18

## 2024-03-15 NOTE — TELEPHONE ENCOUNTER
Patient is scheduled for surgery on Monday.  She would like for you to call her before Monday.  She has some questions about surgery.

## 2024-03-18 ENCOUNTER — HOSPITAL ENCOUNTER (OUTPATIENT)
Age: 64
Setting detail: OUTPATIENT SURGERY
Discharge: HOME | End: 2024-03-18
Attending: OTOLARYNGOLOGY | Admitting: OTOLARYNGOLOGY
Payer: COMMERCIAL

## 2024-03-18 ENCOUNTER — ANESTHESIA (OUTPATIENT)
Dept: OPERATING ROOM | Age: 64
End: 2024-03-18
Payer: COMMERCIAL

## 2024-03-18 VITALS
SYSTOLIC BLOOD PRESSURE: 125 MMHG | WEIGHT: 182.8 LBS | BODY MASS INDEX: 27.08 KG/M2 | RESPIRATION RATE: 18 BRPM | OXYGEN SATURATION: 92 % | HEIGHT: 69 IN | HEART RATE: 71 BPM | TEMPERATURE: 98.2 F | DIASTOLIC BLOOD PRESSURE: 66 MMHG

## 2024-03-18 DIAGNOSIS — E21.3 HPTH (HYPERPARATHYROIDISM) (HCC): ICD-10-CM

## 2024-03-18 DIAGNOSIS — D49.2 NEOPLASM OF SKIN OF FACE: ICD-10-CM

## 2024-03-18 DIAGNOSIS — G89.18 ACUTE POST-OPERATIVE PAIN: Primary | ICD-10-CM

## 2024-03-18 DIAGNOSIS — E83.52 HYPERCALCEMIA: ICD-10-CM

## 2024-03-18 DIAGNOSIS — D35.1 PARATHYROID ADENOMA: ICD-10-CM

## 2024-03-18 LAB
PTH-INTACT SERPL-MCNC: 103.9 PG/ML (ref 14–72)
PTH-INTACT SERPL-MCNC: 51.8 PG/ML (ref 14–72)

## 2024-03-18 PROCEDURE — 2580000003 HC RX 258

## 2024-03-18 PROCEDURE — 7100000010 HC PHASE II RECOVERY - FIRST 15 MIN: Performed by: OTOLARYNGOLOGY

## 2024-03-18 PROCEDURE — 88307 TISSUE EXAM BY PATHOLOGIST: CPT

## 2024-03-18 PROCEDURE — 3600000014 HC SURGERY LEVEL 4 ADDTL 15MIN: Performed by: OTOLARYNGOLOGY

## 2024-03-18 PROCEDURE — 6360000002 HC RX W HCPCS: Performed by: OTOLARYNGOLOGY

## 2024-03-18 PROCEDURE — 3600000004 HC SURGERY LEVEL 4 BASE: Performed by: OTOLARYNGOLOGY

## 2024-03-18 PROCEDURE — 60500 EXPLORE PARATHYROID GLANDS: CPT | Performed by: OTOLARYNGOLOGY

## 2024-03-18 PROCEDURE — 7100000011 HC PHASE II RECOVERY - ADDTL 15 MIN: Performed by: OTOLARYNGOLOGY

## 2024-03-18 PROCEDURE — 2580000003 HC RX 258: Performed by: OTOLARYNGOLOGY

## 2024-03-18 PROCEDURE — 2580000003 HC RX 258: Performed by: ANESTHESIOLOGY

## 2024-03-18 PROCEDURE — A4217 STERILE WATER/SALINE, 500 ML: HCPCS | Performed by: OTOLARYNGOLOGY

## 2024-03-18 PROCEDURE — 6360000002 HC RX W HCPCS

## 2024-03-18 PROCEDURE — 88342 IMHCHEM/IMCYTCHM 1ST ANTB: CPT

## 2024-03-18 PROCEDURE — 7100000000 HC PACU RECOVERY - FIRST 15 MIN: Performed by: OTOLARYNGOLOGY

## 2024-03-18 PROCEDURE — 11440 EXC FACE-MM B9+MARG 0.5 CM/<: CPT | Performed by: OTOLARYNGOLOGY

## 2024-03-18 PROCEDURE — 88341 IMHCHEM/IMCYTCHM EA ADD ANTB: CPT

## 2024-03-18 PROCEDURE — 88305 TISSUE EXAM BY PATHOLOGIST: CPT

## 2024-03-18 PROCEDURE — 3700000001 HC ADD 15 MINUTES (ANESTHESIA): Performed by: OTOLARYNGOLOGY

## 2024-03-18 PROCEDURE — 2500000003 HC RX 250 WO HCPCS

## 2024-03-18 PROCEDURE — 3700000000 HC ANESTHESIA ATTENDED CARE: Performed by: OTOLARYNGOLOGY

## 2024-03-18 PROCEDURE — 2500000003 HC RX 250 WO HCPCS: Performed by: OTOLARYNGOLOGY

## 2024-03-18 PROCEDURE — 60200 REMOVE THYROID LESION: CPT | Performed by: OTOLARYNGOLOGY

## 2024-03-18 PROCEDURE — 83970 ASSAY OF PARATHORMONE: CPT

## 2024-03-18 PROCEDURE — 2709999900 HC NON-CHARGEABLE SUPPLY: Performed by: OTOLARYNGOLOGY

## 2024-03-18 PROCEDURE — 7100000001 HC PACU RECOVERY - ADDTL 15 MIN: Performed by: OTOLARYNGOLOGY

## 2024-03-18 PROCEDURE — 2720000010 HC SURG SUPPLY STERILE: Performed by: OTOLARYNGOLOGY

## 2024-03-18 RX ORDER — SODIUM CHLORIDE 0.9 % (FLUSH) 0.9 %
5-40 SYRINGE (ML) INJECTION PRN
Status: DISCONTINUED | OUTPATIENT
Start: 2024-03-18 | End: 2024-03-18 | Stop reason: HOSPADM

## 2024-03-18 RX ORDER — FENTANYL CITRATE 50 UG/ML
25 INJECTION, SOLUTION INTRAMUSCULAR; INTRAVENOUS EVERY 5 MIN PRN
Status: DISCONTINUED | OUTPATIENT
Start: 2024-03-18 | End: 2024-03-18 | Stop reason: HOSPADM

## 2024-03-18 RX ORDER — OXYCODONE HYDROCHLORIDE 5 MG/1
10 TABLET ORAL PRN
Status: DISCONTINUED | OUTPATIENT
Start: 2024-03-18 | End: 2024-03-18 | Stop reason: HOSPADM

## 2024-03-18 RX ORDER — LIDOCAINE HYDROCHLORIDE AND EPINEPHRINE 10; 10 MG/ML; UG/ML
INJECTION, SOLUTION INFILTRATION; PERINEURAL PRN
Status: DISCONTINUED | OUTPATIENT
Start: 2024-03-18 | End: 2024-03-18 | Stop reason: HOSPADM

## 2024-03-18 RX ORDER — SODIUM CHLORIDE 0.9 % (FLUSH) 0.9 %
5-40 SYRINGE (ML) INJECTION EVERY 12 HOURS SCHEDULED
Status: DISCONTINUED | OUTPATIENT
Start: 2024-03-18 | End: 2024-03-18 | Stop reason: HOSPADM

## 2024-03-18 RX ORDER — HYDROMORPHONE HYDROCHLORIDE 1 MG/ML
0.5 INJECTION, SOLUTION INTRAMUSCULAR; INTRAVENOUS; SUBCUTANEOUS EVERY 5 MIN PRN
Status: DISCONTINUED | OUTPATIENT
Start: 2024-03-18 | End: 2024-03-18 | Stop reason: HOSPADM

## 2024-03-18 RX ORDER — DEXAMETHASONE SODIUM PHOSPHATE 4 MG/ML
INJECTION, SOLUTION INTRA-ARTICULAR; INTRALESIONAL; INTRAMUSCULAR; INTRAVENOUS; SOFT TISSUE PRN
Status: DISCONTINUED | OUTPATIENT
Start: 2024-03-18 | End: 2024-03-18 | Stop reason: SDUPTHER

## 2024-03-18 RX ORDER — FENTANYL CITRATE 50 UG/ML
INJECTION, SOLUTION INTRAMUSCULAR; INTRAVENOUS PRN
Status: DISCONTINUED | OUTPATIENT
Start: 2024-03-18 | End: 2024-03-18 | Stop reason: SDUPTHER

## 2024-03-18 RX ORDER — OXYCODONE HYDROCHLORIDE 5 MG/1
5 TABLET ORAL PRN
Status: DISCONTINUED | OUTPATIENT
Start: 2024-03-18 | End: 2024-03-18 | Stop reason: HOSPADM

## 2024-03-18 RX ORDER — MAGNESIUM HYDROXIDE 1200 MG/15ML
LIQUID ORAL CONTINUOUS PRN
Status: DISCONTINUED | OUTPATIENT
Start: 2024-03-18 | End: 2024-03-18 | Stop reason: HOSPADM

## 2024-03-18 RX ORDER — SODIUM CHLORIDE 9 MG/ML
INJECTION, SOLUTION INTRAVENOUS PRN
Status: DISCONTINUED | OUTPATIENT
Start: 2024-03-18 | End: 2024-03-18 | Stop reason: HOSPADM

## 2024-03-18 RX ORDER — BENZOCAINE/MENTHOL 6 MG-10 MG
LOZENGE MUCOUS MEMBRANE 2 TIMES DAILY
COMMUNITY

## 2024-03-18 RX ORDER — HYDROCODONE BITARTRATE AND ACETAMINOPHEN 5; 325 MG/1; MG/1
1-2 TABLET ORAL EVERY 6 HOURS PRN
Qty: 30 TABLET | Refills: 0 | Status: SHIPPED | OUTPATIENT
Start: 2024-03-18 | End: 2024-03-25

## 2024-03-18 RX ORDER — PROPOFOL 10 MG/ML
INJECTION, EMULSION INTRAVENOUS PRN
Status: DISCONTINUED | OUTPATIENT
Start: 2024-03-18 | End: 2024-03-18 | Stop reason: SDUPTHER

## 2024-03-18 RX ORDER — SODIUM CHLORIDE, SODIUM LACTATE, POTASSIUM CHLORIDE, CALCIUM CHLORIDE 600; 310; 30; 20 MG/100ML; MG/100ML; MG/100ML; MG/100ML
INJECTION, SOLUTION INTRAVENOUS CONTINUOUS
Status: DISCONTINUED | OUTPATIENT
Start: 2024-03-18 | End: 2024-03-18 | Stop reason: HOSPADM

## 2024-03-18 RX ORDER — BACITRACIN 500 [USP'U]/G
OINTMENT OPHTHALMIC
Qty: 1 EACH | Refills: 0
Start: 2024-03-18

## 2024-03-18 RX ORDER — LIDOCAINE HYDROCHLORIDE 20 MG/ML
INJECTION, SOLUTION INTRAVENOUS PRN
Status: DISCONTINUED | OUTPATIENT
Start: 2024-03-18 | End: 2024-03-18 | Stop reason: SDUPTHER

## 2024-03-18 RX ORDER — NALOXONE HYDROCHLORIDE 0.4 MG/ML
INJECTION, SOLUTION INTRAMUSCULAR; INTRAVENOUS; SUBCUTANEOUS PRN
Status: DISCONTINUED | OUTPATIENT
Start: 2024-03-18 | End: 2024-03-18 | Stop reason: HOSPADM

## 2024-03-18 RX ORDER — SUCCINYLCHOLINE/SOD CL,ISO/PF 200MG/10ML
SYRINGE (ML) INTRAVENOUS PRN
Status: DISCONTINUED | OUTPATIENT
Start: 2024-03-18 | End: 2024-03-18 | Stop reason: SDUPTHER

## 2024-03-18 RX ORDER — LABETALOL HYDROCHLORIDE 5 MG/ML
10 INJECTION, SOLUTION INTRAVENOUS
Status: DISCONTINUED | OUTPATIENT
Start: 2024-03-18 | End: 2024-03-18 | Stop reason: HOSPADM

## 2024-03-18 RX ORDER — HYDROMORPHONE HYDROCHLORIDE 2 MG/ML
INJECTION, SOLUTION INTRAMUSCULAR; INTRAVENOUS; SUBCUTANEOUS PRN
Status: DISCONTINUED | OUTPATIENT
Start: 2024-03-18 | End: 2024-03-18 | Stop reason: SDUPTHER

## 2024-03-18 RX ORDER — LIDOCAINE HYDROCHLORIDE 10 MG/ML
1 INJECTION, SOLUTION EPIDURAL; INFILTRATION; INTRACAUDAL; PERINEURAL
Status: DISCONTINUED | OUTPATIENT
Start: 2024-03-18 | End: 2024-03-18 | Stop reason: HOSPADM

## 2024-03-18 RX ORDER — MIDAZOLAM HYDROCHLORIDE 1 MG/ML
INJECTION INTRAMUSCULAR; INTRAVENOUS PRN
Status: DISCONTINUED | OUTPATIENT
Start: 2024-03-18 | End: 2024-03-18 | Stop reason: SDUPTHER

## 2024-03-18 RX ORDER — ONDANSETRON 2 MG/ML
INJECTION INTRAMUSCULAR; INTRAVENOUS PRN
Status: DISCONTINUED | OUTPATIENT
Start: 2024-03-18 | End: 2024-03-18 | Stop reason: SDUPTHER

## 2024-03-18 RX ORDER — PROCHLORPERAZINE EDISYLATE 5 MG/ML
5 INJECTION INTRAMUSCULAR; INTRAVENOUS
Status: DISCONTINUED | OUTPATIENT
Start: 2024-03-18 | End: 2024-03-18 | Stop reason: HOSPADM

## 2024-03-18 RX ADMIN — SODIUM CHLORIDE 2000 MG: 900 INJECTION INTRAVENOUS at 11:32

## 2024-03-18 RX ADMIN — PROPOFOL 150 MG: 10 INJECTION, EMULSION INTRAVENOUS at 11:27

## 2024-03-18 RX ADMIN — Medication 120 MG: at 11:28

## 2024-03-18 RX ADMIN — DEXMEDETOMIDINE HYDROCHLORIDE 8 MCG: 100 INJECTION, SOLUTION INTRAVENOUS at 12:43

## 2024-03-18 RX ADMIN — SODIUM CHLORIDE, POTASSIUM CHLORIDE, SODIUM LACTATE AND CALCIUM CHLORIDE: 600; 310; 30; 20 INJECTION, SOLUTION INTRAVENOUS at 12:26

## 2024-03-18 RX ADMIN — ONDANSETRON 4 MG: 2 INJECTION INTRAMUSCULAR; INTRAVENOUS at 12:29

## 2024-03-18 RX ADMIN — PROPOFOL 50 MG: 10 INJECTION, EMULSION INTRAVENOUS at 12:43

## 2024-03-18 RX ADMIN — SODIUM CHLORIDE, POTASSIUM CHLORIDE, SODIUM LACTATE AND CALCIUM CHLORIDE: 600; 310; 30; 20 INJECTION, SOLUTION INTRAVENOUS at 10:52

## 2024-03-18 RX ADMIN — LIDOCAINE HYDROCHLORIDE 100 MG: 20 INJECTION, SOLUTION INTRAVENOUS at 11:27

## 2024-03-18 RX ADMIN — MIDAZOLAM HYDROCHLORIDE 2 MG: 2 INJECTION, SOLUTION INTRAMUSCULAR; INTRAVENOUS at 11:21

## 2024-03-18 RX ADMIN — HYDROMORPHONE HYDROCHLORIDE 0.5 MG: 2 INJECTION, SOLUTION INTRAMUSCULAR; INTRAVENOUS; SUBCUTANEOUS at 12:48

## 2024-03-18 RX ADMIN — FENTANYL CITRATE 50 MCG: 50 INJECTION, SOLUTION INTRAMUSCULAR; INTRAVENOUS at 11:34

## 2024-03-18 RX ADMIN — FENTANYL CITRATE 50 MCG: 50 INJECTION, SOLUTION INTRAMUSCULAR; INTRAVENOUS at 11:24

## 2024-03-18 RX ADMIN — DEXAMETHASONE SODIUM PHOSPHATE 12 MG: 4 INJECTION INTRA-ARTICULAR; INTRALESIONAL; INTRAMUSCULAR; INTRAVENOUS; SOFT TISSUE at 11:33

## 2024-03-18 ASSESSMENT — PAIN SCALES - GENERAL
PAINLEVEL_OUTOF10: 0
PAINLEVEL_OUTOF10: 0

## 2024-03-18 NOTE — PROGRESS NOTES
Vitals:    03/18/24 1345   BP: 125/66   Pulse: 71   Resp: 18   Temp: 98.2 °F (36.8 °C)   SpO2: 92%         Intake/Output Summary (Last 24 hours) at 3/18/2024 1403  Last data filed at 3/18/2024 1351  Gross per 24 hour   Intake 1470 ml   Output 10 ml   Net 1460 ml       Pain assessment:  none  Pain Level: 0    Patient transferred to care of [facility] RN. Taken to [facility] per [place]. Denies pain. Incisions unremarkable. Tolerating po. No nausea.    3/18/2024 2:03 PM

## 2024-03-18 NOTE — ANESTHESIA POSTPROCEDURE EVALUATION
Department of Anesthesiology  Postprocedure Note    Patient: Mckayla Trejo  MRN: [id number]  YOB: 1960  Date of evaluation: 3/18/2024    Procedure Summary       Date: 03/18/24 Room / Location: [place] [address] / [facility]    Anesthesia Start: 1122 Anesthesia Stop: 1314    Procedures:       PARATHYROIDECTOMY RIGHT, THYROID ISTHMUSECTOMY (Right: Neck)      EXCISION BENIGN NEOPLASM SKIN OF FACE WITH PRIMARY CLOSURE LESS THAN 0.5 CENTIMETER, LEFT (Left: Eye) Diagnosis:       Parathyroid adenoma      HPTH (hyperparathyroidism) (HCC)      Hypercalcemia      Neoplasm of skin of face      (Parathyroid adenoma [D35.1])      (HPTH (hyperparathyroidism) (HCC) [E21.3])      (Hypercalcemia [E83.52])      (Neoplasm of skin of face [D49.2])    Surgeons: Enrique Watkins DO Responsible Provider: Prince Roy DO    Anesthesia Type: general ASA Status: 2            Anesthesia Type: No value filed.    Cierra Phase I: Cierra Score: 10    Cierra Phase II:      Anesthesia Post Evaluation    Patient location during evaluation: PACU  Patient participation: complete - patient participated  Level of consciousness: awake and alert  Airway patency: patent  Nausea & Vomiting: no nausea and no vomiting  Cardiovascular status: hemodynamically stable  Respiratory status: acceptable  Hydration status: euvolemic  Multimodal analgesia pain management approach  Pain management: satisfactory to patient    No notable events documented.

## 2024-03-18 NOTE — BRIEF OP NOTE
Brief Postoperative Note      Patient: Mckayla Trejo  YOB: 1960  MRN: [id number]    Date of Procedure: 3/18/2024    Pre-Op Diagnosis Codes:     * Parathyroid adenoma [D35.1]     * HPTH (hyperparathyroidism) (HCC) [E21.3]     * Hypercalcemia [E83.52]     * Neoplasm of skin of face [D49.2]    Post-Op Diagnosis: Same       Procedure(s):  PARATHYROIDECTOMY RIGHT, THYROID ISTHMUSECTOMY  EXCISION BENIGN NEOPLASM SKIN OF FACE WITH PRIMARY CLOSURE LESS THAN 0.5 CENTIMETER, LEFT    Surgeon(s):  Enrique Watkins DO    Assistant:  Resident: Matty Will MD    Anesthesia: General    Estimated Blood Loss (mL): Minimal    Complications: None    Specimens:   ID Type Source Tests Collected by Time Destination   1 : 1) BLOOD FOR PTH Blood Blood PTH, INTRAOPERATIVE Enrique Watkins DO 3/18/2024 1204    A : A) RIGHT PARATHYROID Tissue Tissue SURGICAL PATHOLOGY Enrique Watkins DO 3/18/2024 1207    B : B) THYROID ISTHMUS Tissue Tissue SURGICAL PATHOLOGY Enrique Watkins DO 3/18/2024 1219    C : C) NEOPLASM OF SKIN OF FACE - LEFT Tissue Tissue SURGICAL PATHOLOGY Enrique Watkins DO 3/18/2024 1223        Implants:  * No implants in log *      Drains: * No LDAs found *      Electronically signed by Enrique Watkins DO on 3/18/2024 at 1:06 PM

## 2024-03-18 NOTE — DISCHARGE INSTRUCTIONS
[facility]  Enrique Watkins D.O.  [address]  [place], [place] [id number] [phone]    POST-OP Instructions for Neck Operations  Diet  Resume regular diet, as tolerated.  You may experience some nausea after surgery for up to 24 hours from the general anesthetic.  Take any pain medications with food to avoid upset stomach   Drink plenty of liquids    Activity  Avoid Straining, bending or lifting or vigorous exercise for 2 weeks  Keep the head of your bed elevated to reduce swelling for the first 72 hours  May shower 72 hours after surgery.   You may bath in lukewarm water taking care not to get the incision wet    General Instructions  Drainage from the incision during the first few days is expected.  If you have excessive bleeding or green drainage with surrounding redness please call the number above.  Clean the eye incision with gentle antibacterial soap daily on a cotton tipped swab  Apply Bacitracin ointment to the incision twice daily for 1 week.  This will be provided.  The neck incision is closed with skin glue. Avoid getting wet for 48 hours. You may begin to peel the glue after one week.    Medications  Resume your normal medications  Take pain medications as needed for pain  DO NOT use any herbal medicines/diet pills for two weeks after surgery.    Call the Office  Fever greater than 100.4  Sudden swelling in neck causing difficulty swallowing or breathing is an emergency.    Sudden increase in pain      [facility] AMBULATORY PROCEDURE DISCHARGE INSTRUCTIONS    There are potential side effects of anesthesia or sedation you may experience for the first 24 hours.  These side effects include:    Confusion or Memory loss, Dizziness, or Delayed Reaction Times   [x]A responsible person should be with you for the next 24 hours.  Do not operate any vehicles (automobiles, bicycles, motorcycles) or power tools or machinery for 24 hours.  Do not sign any legal documents or make any  site near you can be found on the [employer] website (deadiversion.usdoj.gov).    If you have a CPAP machine, it is very important that you use it daily during all periods of sleep and daytime rest during your recovery at home.  Surgery and Anesthesia place a significant amount of stress on your body.  Using your CPAP will help keep you safe and lessen the negative effects of that stress.    FOLLOW-UP RECOVERY CARE:  [x]Call the office at [phone] for follow-up appointment and problems    Watch for these possible complications, symptoms, or side effects of anesthesia.  Call physician if they or any other problems occur:  Signs of INFECTION   > Fever over 101°     > Redness, swelling, hardness or warmth at the operative site   >Foul smelling or cloudy drainage at the operative site   Unrelieved PAIN  Unrelieved NAUSEA  Blood soaked dressing.  (Some oozing may be normal)  Inability to urinate      Numb, pale, blue, cold or tingling extremity      Physician:  Dr. Watkins    The above instructions were reviewed with patient/significant other.  The following additional patient specific information was reviewed with the patient/significant other:  [x]Procedure/physician specific instructions  []Medication information sheet(S) including potential side effects  []Shantal’s egress test  []Pain Ball management  []FAQ Catheter associated blood stream infections  [x]FAQ Surgical Site Infections  []Other-    I have read and understand the instructions given to me: ____________________________________________   (Patient/S.O. Signature)            Date/time 3/18/2024 1:06 PM                 If you smoke STOP. We care about your health!

## 2024-03-18 NOTE — H&P
Updated History & Physical     The patient's History and Physical of 2/28/24 was reviewed with the patient, and I examined the patient.  Patient noted to have right-sided torso rash consistent with shingles.  Also discussed plan for thyroid isthmusectomy in addition to the previously-planned procedure.  The surgical site was confirmed by the patient and me.       Plan: The risks, benefits, expected outcome, and alternative to the recommended procedure have been discussed with the patient. Patient understands and wants to proceed with the procedure.      Electronically signed by Matty Will MD on 03/18/24 at 10:31 AM.

## 2024-03-18 NOTE — ANESTHESIA PRE PROCEDURE
Department of Anesthesiology  Preprocedure Note       Name:  Mckayla Trejo   Age:  63 y.o.  :  1960                                          MRN:  [id number]         Date:  3/18/2024      Surgeon: Surgeon(s):  Enrique Watkins DO    Procedure: Procedure(s):  PARATHYROIDECTOMY RIGHT  EXCISION BENIGN NEOPLASM SKIN OF FACE WITH PRIMARY CLOSURE LESS THAN 0.5 CENTIMETER, LEFT    Medications prior to admission:   Prior to Admission medications    Medication Sig Start Date End Date Taking? Authorizing Provider   ammonium lactate (AMLACTIN) 12 % cream Apply 1 Application topically 24  Yes Willow Cooper MD   aspirin 81 MG EC tablet Take 1 tablet by mouth daily    Willow Cooper MD   ibuprofen (ADVIL;MOTRIN) 800 MG tablet Take 1 tablet by mouth 3 times daily 23   Willow Cooper MD   metoprolol succinate (TOPROL XL) 25 MG extended release tablet Take 1 tablet by mouth daily 24   Trudy Suazo MD   MULTIPLE VITAMINS-MINERALS PO Take 1 tablet by mouth daily    Willow Cooper MD   meclizine (ANTIVERT) 25 MG tablet Take 1 tablet by mouth 3 times daily as needed 21   Willow Cooper MD   ondansetron (ZOFRAN) 4 MG tablet Take 1 tablet by mouth every 8 hours as needed 21   Willow Cooper MD   vitamin D 25 MCG (1000 UT) CAPS Take 1 capsule by mouth daily    Willow Cooper MD       Current medications:    Current Facility-Administered Medications   Medication Dose Route Frequency Provider Last Rate Last Admin   • lidocaine PF 1 % injection 1 mL  1 mL IntraDERmal Once PRN Konrad Ha MD       • lactated ringers IV soln infusion   IntraVENous Continuous Konrad Ha MD       • sodium chloride flush 0.9 % injection 5-40 mL  5-40 mL IntraVENous 2 times per day Konrad Ha MD       • sodium chloride flush 0.9 % injection 5-40 mL  5-40 mL IntraVENous PRN Konrad Ha MD       • sodium chloride flush 0.9 % injection 5-40 mL  5-40 mL IntraVENous 2 times

## 2024-03-18 NOTE — BRIEF OP NOTE
Brief Postoperative Note      Patient: Mckayla Trejo  YOB: 1960  MRN: [id number]    Date of Procedure: 3/18/2024    Pre-Op Diagnosis Codes:     * Parathyroid adenoma [D35.1]     * HPTH (hyperparathyroidism) (HCC) [E21.3]     * Hypercalcemia [E83.52]     * Neoplasm of skin of face [D49.2]    Post-Op Diagnosis: Same       Procedure(s):  PARATHYROIDECTOMY RIGHT, THYROID ISTHMUSECTOMY  EXCISION BENIGN NEOPLASM SKIN OF FACE WITH PRIMARY CLOSURE LESS THAN 0.5 CENTIMETER, LEFT    Surgeon(s):  Enrique Watkins DO    Assistant:  Resident: Matty Will MD    Anesthesia: General    Estimated Blood Loss (mL): Minimal    Complications: None    Specimens:   ID Type Source Tests Collected by Time Destination   1 : 1) BLOOD FOR PTH Blood Blood PTH, INTRAOPERATIVE Enrique Watkins,  3/18/2024 1204    A : A) RIGHT PARATHYROID Tissue Tissue SURGICAL PATHOLOGY Enrique Watkins, DO 3/18/2024 1207    B : B) THYROID ISTHMUS Tissue Tissue SURGICAL PATHOLOGY Enrique Watkins, DO 3/18/2024 1219    C : C) NEOPLASM OF SKIN OF FACE - LEFT Tissue Tissue SURGICAL PATHOLOGY Enrique Watkins, DO 3/18/2024 1223        Implants:  * No implants in log *      Drains: * No LDAs found *    Findings: Excision of thyroid isthmus nodule and right-sided parathyroid adenoma; pre-op .9, intra-op PTH 51.8    Electronically signed by Matty Will MD on 3/18/2024 at 1:02 PM

## 2024-03-18 NOTE — PROGRESS NOTES
Ambulatory Surgery/Procedure Discharge Note    Vitals:    03/18/24 1356   BP: 125/66   Pulse: 71   Resp: 18   Temp: 98.2 °F (36.8 °C)   SpO2: 92%       In: 1470 [P.O.:120; I.V.:1300]  Out: 10     Restroom use offered before discharge.  Yes    Pain assessment:  none  Pain Level: 0    Pt and S.O./family states \"ready to go home\". Pt alert and oriented x4. IV removed. Denies N/V or pain. Incision to neck and L eye, c/d/I. Neck incision MILDRED with surgical glue. Pt tolerating po intake. Discharge instructions given to pt and wife with pt permission. Pt and wife verbalized understanding of all instructions. Left with all belongings, and discharge instructions.     Patient discharged to home/self care. Patient discharged via wheel chair by transporter to waiting family/S.O.       3/18/2024 2:39 PM

## 2024-03-18 NOTE — PROGRESS NOTES
Brought to PACU from OR. Report received from CRNA, OR RN and surgical resident.Pt easily arousable to name. Placed on monitors. Ant neck incision MILDRED, no drainage, no swelling. Left uppder cheek incision MILDRED, no drainage, no swelling. Denies pain.

## 2024-03-18 NOTE — PROGRESS NOTES
Pt to [facility] for parathyroidectomy, etc.  Pt is alert; oriented X 4; speech clear; breathing easily on RA; denies any pain; walks with steady gait without assist.  Pt c/o rash on right side of torso which pt states appeared on Saturday, and pt took ibuprofen for it that day.  This RN viewed same and rash goes from right upper ribs across to right mid abdomen, red/pink, with some small blisters/pustules.  Resembles shingles, and Dr. Watkins visualized same and aware ibuprofen taken 2 days ago, and okay to proceed with surgery.  Dr. Roy of anesthesia also aware, and okay to proceed.  Consent changed to add THYROID ISTHMUSECTOMY, so same changed and resigned consent.  #20 IV placed in right AC area, and PTH drawn and sent to lab.  Pt now to surgery.  Ancef 2 g IVPB to OR with pt.   Hermann in waiting room and has pt's purse with him with her permission.

## 2024-03-18 NOTE — OP NOTE
Operative Note      Patient: Mckayla Trejo  YOB: 1960  MRN: [id number]    Date of Procedure: 3/18/2024    Pre-Op Diagnosis Codes:     * Parathyroid adenoma [D35.1]     * HPTH (hyperparathyroidism) (HCC) [E21.3]     * Hypercalcemia [E83.52]     * Neoplasm of skin of face [D49.2]    Post-Op Diagnosis: Same       Procedure(s):  PARATHYROIDECTOMY RIGHT, THYROID ISTHMUSECTOMY  EXCISION BENIGN NEOPLASM SKIN OF FACE WITH PRIMARY CLOSURE LESS THAN 0.5 CENTIMETER, LEFT    Surgeon(s):  Enrique Waktins DO    Assistant:   Resident: Matty Will MD    Anesthesia: General    Estimated Blood Loss (mL): Minimal    Complications: None    Specimens:   ID Type Source Tests Collected by Time Destination   1 : 1) BLOOD FOR PTH Blood Blood PTH, INTRAOPERATIVE Enrique Watkins,  3/18/2024 1204    A : A) RIGHT PARATHYROID Tissue Tissue SURGICAL PATHOLOGY Enrique Watkins, DO 3/18/2024 1207    B : B) THYROID ISTHMUS Tissue Tissue SURGICAL PATHOLOGY Enrique Watkins, DO 3/18/2024 1219    C : C) NEOPLASM OF SKIN OF FACE - LEFT Tissue Tissue SURGICAL PATHOLOGY Enrique Watkins, DO 3/18/2024 1223        Implants:  * No implants in log *      Drains: * No LDAs found *    Findings: Left-sided neoplasm of the skin of the eye measuring 1.5 cm.  Right parathyroid adenoma.  50% drop of PTH into normal range after removal.  Adequate hemostasis.  Thyroid isthmus removed.          Detailed Description of Procedure:   Patient was identified in preoperative holding area.  Verbal informed consent was obtained.  The right side of the neck was marked.  The left eye was marked.  The patient was taken to the operating suite, transferred to the operating table, sedated with general anesthesia and intubated using a Nims tube.  Shoulder roll was placed.  A midline neck incision was marked out an crease just below the cricoid cartilage.  A incision around the neoplasm of the skin of the eye on the left was then marked out using a marking pen.   The skin was then infiltrated with 1% lidocaine with 1: 100,000 epinephrine.  The Nims leads were then connected to the monitor and found to be appropriately functioning.  The patient was prepped and draped in a sterile fashion.  A proper timeout was performed.      Surgery began with the neck and the parathyroid.  15 blade was used to make the incision in the marked out skin.  The incision was carried down to the subcutaneous fat plane.  Monopolar electrocautery was used to carry the incision in the subplatysmal plane.  Subplatysmal flaps were elevated superiorly and inferiorly.  Using the harmonic scalpel, dissection was carried deep and the midline raphae was identified.  The strap muscles were split at the midline.  Then, using the Sullivan scalpel, the strap muscles were dissected off of the anterior lateral aspect of the right thyroid lobe.  The dissection was carried more laterally and deep.  This was performed using the harmonic scalpel and a mosquito forcep.  We then freed up enough of the thyroid tissue to be able to rotate the right lobe anteromedially.  During this, the parathyroid adenoma was then exposed.  Using Kirk nerve dissector, bipolar electrocautery and Metzenbaum scissors, the parathyroid adenoma was dissected free from the surrounding tissues.  The recurrent laryngeal nerve on the right was not encountered during the dissection.  However there was no nerve appearing tissue during the dissection as well.  Once the adenoma was removed, 5 minutes past and we alejandra a separate PTH intraoperatively.  The intraoperative PTH was found to drop 50% within normal range.    After removal of the parathyroid adenoma, attention was then turned to the thyroid isthmus.  The harmonic scalpel was used to dissect the nodule within the isthmus away from the surrounding thyroid tissue.  This was removed in its entirety and sent as a separate specimen.  Bipolar electrocautery was used to control any bleeding.    The

## 2024-03-20 ENCOUNTER — TELEPHONE (OUTPATIENT)
Dept: PRIMARY CARE CLINIC | Age: 64
End: 2024-03-20

## 2024-03-20 DIAGNOSIS — B02.9 HERPES ZOSTER WITHOUT COMPLICATION: Primary | ICD-10-CM

## 2024-03-20 RX ORDER — VALACYCLOVIR HYDROCHLORIDE 1 G/1
1000 TABLET, FILM COATED ORAL 3 TIMES DAILY
Qty: 21 TABLET | Refills: 0 | Status: SHIPPED | OUTPATIENT
Start: 2024-03-20 | End: 2024-03-22

## 2024-03-20 NOTE — TELEPHONE ENCOUNTER
Patient had parathyroidectomy on this past Monday and was also diagnosed with shingles by surgery team. She says the outbreak is active and she would like something for it. There were no appointments available. Please advise ASAP. Next appointment 4/4/24.

## 2024-03-21 NOTE — TELEPHONE ENCOUNTER
Called and spoke with patient and she stated she cant really look at it its more on her abdomen and goes to her spine. Patient stated \"It very red and there are blisters on top of blisters it was very painful, I had to go to urgent care.\" Patient states at urgent care she  was put on a medication gabapentin,acyclovir, and a 3% lidocaine cream. Patient also states she had surgery on monday for thyroid, and had been using norco for pain. Patient states she spoke with nurse practitioner told her this is a very bad case of shingles. Patient wants to know what to do.

## 2024-03-22 NOTE — TELEPHONE ENCOUNTER
Called and spoke with patient states she has narco and takes every 4-6 hours and takes only one to subside the pain. Patient states she posted pictures on facebook and its more red than yesterday., patient states its painful and wants to know if she should take oxy that was prescribed to her from a previous surgery and states  seen rash on her back and looks like \"they are coming open and looks more of an angry red today\" than yesterday. Patient has upcoming appt on 4/4/24 no sooner appt

## 2024-03-22 NOTE — TELEPHONE ENCOUNTER
Spoke with patient and informed of message vinny she states she will stick with norco and rash is only on abdomen and back no where else. Informed patient if feels like symptoms worsen over the weekend do not wait head to ER patient expressed understanding

## 2024-03-26 ENCOUNTER — OFFICE VISIT (OUTPATIENT)
Dept: ENT CLINIC | Age: 64
End: 2024-03-26

## 2024-03-26 ENCOUNTER — PATIENT MESSAGE (OUTPATIENT)
Dept: PRIMARY CARE CLINIC | Age: 64
End: 2024-03-26

## 2024-03-26 VITALS
TEMPERATURE: 97.5 F | HEIGHT: 69 IN | HEART RATE: 71 BPM | RESPIRATION RATE: 16 BRPM | SYSTOLIC BLOOD PRESSURE: 131 MMHG | BODY MASS INDEX: 26.66 KG/M2 | DIASTOLIC BLOOD PRESSURE: 76 MMHG | WEIGHT: 180 LBS

## 2024-03-26 DIAGNOSIS — Z48.89 POSTOPERATIVE VISIT: ICD-10-CM

## 2024-03-26 DIAGNOSIS — C73 ENCAPSULATED PAPILLARY CARCINOMA OF THYROID (HCC): ICD-10-CM

## 2024-03-26 DIAGNOSIS — B02.9 HERPES ZOSTER WITHOUT COMPLICATION: Primary | ICD-10-CM

## 2024-03-26 DIAGNOSIS — D35.1 PARATHYROID ADENOMA: Primary | ICD-10-CM

## 2024-03-26 DIAGNOSIS — D23.9 HYDROCYSTOMA: ICD-10-CM

## 2024-03-26 PROCEDURE — 99024 POSTOP FOLLOW-UP VISIT: CPT | Performed by: OTOLARYNGOLOGY

## 2024-03-26 RX ORDER — PREDNISONE 10 MG/1
TABLET ORAL
Qty: 23 TABLET | Refills: 0 | Status: SHIPPED | OUTPATIENT
Start: 2024-03-26 | End: 2024-04-07

## 2024-03-26 RX ORDER — GABAPENTIN 300 MG/1
300 CAPSULE ORAL 2 TIMES DAILY
Qty: 28 CAPSULE | Refills: 0 | Status: SHIPPED | OUTPATIENT
Start: 2024-03-26 | End: 2024-04-09

## 2024-03-26 NOTE — PROGRESS NOTES
thyroid (HCC)    - Yury Ledbetter MD, Endocrinology, [place]    3. Hydrocystoma      4. Postoperative visit        Return in about 2 months (around 5/26/2024).      [ ] Review/order radiology tests   [ ] Independent interpretation of diagnostic test by another provider  [ ] Discussed case with another provider  [ ] High risk of loss of major body function  [ ] Elective major surgery with risk factors    Portions of this note were dictated using Dragon. There may be linguistic errors secondary to the use of this program.

## 2024-03-26 NOTE — TELEPHONE ENCOUNTER
From: Mckayla Trejo  To: Dr. Trudy Suazo  Sent: 3/26/2024 9:43 AM EDT  Subject: Pain Assistance to 4/4    Hello Doc; attached you will see the current state of my Shingles. I am about to run out of gabapentin 300mg BID and am having ongoing pain/burning issues. I am afraid the pain and burning will get worse once off the gabapentin. Can you extend the prescription until I see you next (4/4) please? I would be interested in any other pain relief you can offer too. At times the burning is severe. I have prescription 3% lidocaine cream. Thanks Mckayla Trejo.

## 2024-03-27 RX ORDER — LIDOCAINE 30 MG/G
1 CREAM TOPICAL 2 TIMES DAILY PRN
Qty: 85 G | Refills: 1 | Status: SHIPPED | OUTPATIENT
Start: 2024-03-27

## 2024-04-04 ENCOUNTER — OFFICE VISIT (OUTPATIENT)
Dept: PRIMARY CARE CLINIC | Age: 64
End: 2024-04-04
Payer: COMMERCIAL

## 2024-04-04 VITALS
SYSTOLIC BLOOD PRESSURE: 130 MMHG | TEMPERATURE: 97.3 F | HEART RATE: 74 BPM | BODY MASS INDEX: 27.08 KG/M2 | HEIGHT: 69 IN | DIASTOLIC BLOOD PRESSURE: 82 MMHG | OXYGEN SATURATION: 92 % | WEIGHT: 182.8 LBS

## 2024-04-04 DIAGNOSIS — R73.03 PREDIABETES: ICD-10-CM

## 2024-04-04 DIAGNOSIS — Q82.8 PALMOPLANTAR KERATODERMA: ICD-10-CM

## 2024-04-04 DIAGNOSIS — B02.9 HERPES ZOSTER WITHOUT COMPLICATION: Primary | ICD-10-CM

## 2024-04-04 DIAGNOSIS — E78.2 HYPERLIPIDEMIA, MIXED: ICD-10-CM

## 2024-04-04 DIAGNOSIS — Z85.858 HISTORY OF PARATHYROID CANCER: ICD-10-CM

## 2024-04-04 DIAGNOSIS — Z85.850 HX OF PAPILLARY THYROID CARCINOMA: ICD-10-CM

## 2024-04-04 PROBLEM — L85.0 ACQUIRED ICHTHYOSIS: Status: ACTIVE | Noted: 2024-01-14

## 2024-04-04 PROBLEM — R21 RASH IN ADULT: Status: ACTIVE | Noted: 2024-01-14

## 2024-04-04 PROCEDURE — 99214 OFFICE O/P EST MOD 30 MIN: CPT | Performed by: FAMILY MEDICINE

## 2024-04-04 NOTE — PATIENT INSTRUCTIONS
-START tylenol 1000mg three times a day, don't take more than 3 grams in a day  -OK to take ibuprofen up to 800mg three times a day. Take with food and water  -Ok to continue gabapentin in the morning  -Ok to take oxycodone 5mg at night, don't take this with the gabapentin  -Finish the steroid  -Ok to use metaderm, aquaphor, vaseline

## 2024-04-04 NOTE — PROGRESS NOTES
occur as a paraneoplastic manifestation in a variety of internal cancers, including lung, esophageal, bladder, breast, and colon cancer. 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   Only remaining rash is small area of dry/thick/cracking skin on edges of bilat heels, no ulcers, lesions, erythema, or swelling  -Cont ammonia lactate on heels  -If recurrent, consider topical steroid for possible eczema  -Recent TSH wnl:  Lab Results   Component Value Date    TSHFT4 1.98 01/11/2024   -Check NOEMI, HIV  -Ref to hematology-oncology for further evaluation for possible malignancy  Orders:  -     HIV Screen; Future  -     NOEMI Reflex to Antibody Cascade; Future  -     AFL - Tonya Guillermo MD, Hematology/Oncology, [facility] [place]  3. History of parathyroid cancer  Assessment & Plan:  Stable post-op  -F/u ENT as recommended in 2 months  4. 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   -Pt referred to Endo by ENT, advised pt to f/u with Endo as ref, pt has this appt scheduled  5. Hyperlipidemia, mixed  Assessment & Plan:  Stable  -Reviewed nutrition recommendations: limit sugary foods/beverages, limit processed foods, limited starchy/high fat foods, increase lean protein/fruits and vegetables, Med diet  -Repeat in 6-12 months, if worsened consider statin     6. Prediabetes  Assessment & Plan:  -Stable  -Reviewed nutrition recommendations: limit

## 2024-04-04 NOTE — ASSESSMENT & PLAN NOTE
Stable  -Reviewed nutrition recommendations: limit sugary foods/beverages, limit processed foods, limited starchy/high fat foods, increase lean protein/fruits and vegetables, Med diet  -Repeat in 6-12 months, if worsened consider statin

## 2024-04-04 NOTE — ASSESSMENT & PLAN NOTE
Improving  -pt completed antiviral tx  -advised to complete steroids  -pain management:  -START tylenol 1000mg three times a day, don't take more than 3 grams in a day  -OK to take ibuprofen up to 800mg three times a day. Take with food and water  -Ok to continue gabapentin in the morning  -Ok to take oxycodone 5mg at night, don't take this with the gabapentin  -Ok to use metaderm, aquaphor, vaseline

## 2024-04-04 NOTE — ASSESSMENT & PLAN NOTE
Unstable: skin scrapings neg for fungus, improved with ammonium lactate.  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   Per UTD, \"PPK may be a feature of a broad range of acquired skin disorders and is more commonly seen in clinical practice than hereditary PPK. Underlying causes include   -contact dermatitis, chronic hand eczema, psoriasis, pityriasis rubra pilaris  -Keratoderma climactericum - Keratoderma climactericum (Haxthausen's disease) is an acquired keratoderma with painful fissuring described in some postmenopausal women [102,103]. It develops initially on the soles at the pressure points and then becomes confluent. Later, it may extend to the palms. The cause is unknown. Associated factors include obesity and hypertension.  -Exposure to certain drugs or chemicals, such as arsenic and chlorinated hydrocarbons  -Systemic disease, such as myxedema or chronic lymphedema  -Autoimmune disease  -Mycosis fungoides - Hyperkeratosis of palms and soles may occur in association with, or even precede, mycosis fungoides.   -Internal malignancies - PPK may occur as a paraneoplastic manifestation in a variety of internal cancers, including lung, esophageal, bladder, breast, and colon cancer. 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   Only remaining rash is small area of dry/thick/cracking skin on edges of bilat heels, no ulcers, lesions, erythema, or swelling  -Cont ammonia lactate on heels  -If recurrent, consider topical steroid for possible eczema  -Recent TSH wnl:  Lab Results   Component Value Date    TSHFT4 1.98 01/11/2024   -Check NOEMI, HIV  -Ref to

## 2024-04-05 ENCOUNTER — TELEPHONE (OUTPATIENT)
Dept: PRIMARY CARE CLINIC | Age: 64
End: 2024-04-05

## 2024-04-05 PROBLEM — Z85.850 HX OF PAPILLARY THYROID CARCINOMA: Status: ACTIVE | Noted: 2024-01-14

## 2024-04-05 PROBLEM — Z85.858 HISTORY OF PARATHYROID CANCER: Status: ACTIVE | Noted: 2024-02-29

## 2024-04-05 NOTE — TELEPHONE ENCOUNTER
Sepideh with Dr. Guillermo's office at [facility] called regarding the referral that Dr. Suazo sent to their office.     Sepideh states that they do not treat the diagnoses listed on the referral (herpes zoster without complication) & palmoplantar keratoderma.     Best call back if there are any questions: [phone]

## 2024-04-05 NOTE — ASSESSMENT & PLAN NOTE
-Stable  -Reviewed nutrition recommendations: limit sugary foods/beverages, limit processed foods, limited starchy/high fat foods, increase lean protein/fruits and vegetables, Med diet  -Repeat in 1 year

## 2024-04-05 NOTE — ASSESSMENT & PLAN NOTE
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   -Pt referred to Endo by ENT, advised pt to f/u with Endo as ref, pt has this appt scheduled

## 2024-04-10 ENCOUNTER — PATIENT MESSAGE (OUTPATIENT)
Dept: PRIMARY CARE CLINIC | Age: 64
End: 2024-04-10

## 2024-04-10 DIAGNOSIS — B02.9 HERPES ZOSTER WITHOUT COMPLICATION: ICD-10-CM

## 2024-04-10 DIAGNOSIS — B02.9 HERPES ZOSTER WITHOUT COMPLICATION: Primary | ICD-10-CM

## 2024-04-10 RX ORDER — PREDNISONE 2.5 MG/1
TABLET ORAL
Qty: 10 TABLET | Refills: 0 | Status: SHIPPED | OUTPATIENT
Start: 2024-04-10 | End: 2024-04-17

## 2024-04-10 NOTE — TELEPHONE ENCOUNTER
From: Mckayla Trejo  To: Dr. Trudy Suazo  Sent: 4/10/2024 2:26 PM EDT  Subject: Prednisone     My Shingles are now in the stinging phase which can be pretty awful at times. I do think the prednisone helped and am interested to take 5mg per day. Could I get a week’s worth ?

## 2024-04-12 RX ORDER — GABAPENTIN 300 MG/1
300 CAPSULE ORAL 2 TIMES DAILY
Qty: 28 CAPSULE | Refills: 0 | Status: SHIPPED | OUTPATIENT
Start: 2024-04-12 | End: 2024-04-26

## 2024-04-12 NOTE — TELEPHONE ENCOUNTER
Medication:   Requested Prescriptions     Pending Prescriptions Disp Refills    gabapentin (NEURONTIN) 300 MG capsule 28 capsule 0     Sig: Take 1 capsule by mouth 2 times daily for 14 days. Taking this medication with pain medications like opioids or oxycodone can cause serious secondary effects like sedation, respiratory suppression and death        Last Filled:      Patient Phone Number: [phone] (home)     Last appt: 4/4/2024   Next appt: 6/6/2024    Last OARRS:       3/18/2024    12:54 PM   RX Monitoring   Acute Pain Prescriptions Severe pain not adequately treated with lower dose.

## 2024-04-13 ENCOUNTER — PATIENT MESSAGE (OUTPATIENT)
Dept: PRIMARY CARE CLINIC | Age: 64
End: 2024-04-13

## 2024-04-13 DIAGNOSIS — B02.9 HERPES ZOSTER WITHOUT COMPLICATION: Primary | ICD-10-CM

## 2024-04-15 RX ORDER — HYDROCODONE BITARTRATE AND ACETAMINOPHEN 5; 325 MG/1; MG/1
1 TABLET ORAL EVERY 6 HOURS PRN
Qty: 12 TABLET | Refills: 0 | Status: SHIPPED | OUTPATIENT
Start: 2024-04-15 | End: 2024-04-18

## 2024-04-15 NOTE — TELEPHONE ENCOUNTER
From: Mckayla Trejo  To: Dr. Trudy Suazo  Sent: 4/13/2024 10:13 AM EDT  Subject: Narco prescription    Dr Suazo  Overall my Shingles pain is no better and it has been 4 weeks. I am finding it difficult to work. I did take some Narco (x2) yesterday that was prescribed by Shira for post op parathyroidectomy and it gave me 5 hours of PAIN-FREE relief and it does not make me loopy like the oxy. Oxy gives me about 2 1/2 hrs of relief. The gabapentin and pred does take the edge off but at 4 weeks I am TIRED of being in pain. May I please have a week’s worth of Hydrocodone / acetamenaphen 5-325 ? Mckayla

## 2024-04-25 DIAGNOSIS — B02.9 HERPES ZOSTER WITHOUT COMPLICATION: ICD-10-CM

## 2024-04-25 RX ORDER — GABAPENTIN 300 MG/1
300 CAPSULE ORAL 2 TIMES DAILY
Qty: 28 CAPSULE | Refills: 0 | Status: SHIPPED | OUTPATIENT
Start: 2024-04-25 | End: 2024-05-09

## 2024-04-25 NOTE — TELEPHONE ENCOUNTER
Medication:   Requested Prescriptions     Pending Prescriptions Disp Refills    gabapentin (NEURONTIN) 300 MG capsule 28 capsule 0     Sig: Take 1 capsule by mouth 2 times daily for 14 days. Do not take this medication with pain medications like opioids or oxycodone, as using them together can cause serious secondary effects like sedation, respiratory suppression and death        Last Filled:      Patient Phone Number: [phone] (home)     Last appt: 4/4/2024   Next appt: 6/6/2024    Last OARRS:       3/18/2024    12:54 PM   RX Monitoring   Acute Pain Prescriptions Severe pain not adequately treated with lower dose.

## 2024-04-26 ENCOUNTER — PATIENT MESSAGE (OUTPATIENT)
Dept: PRIMARY CARE CLINIC | Age: 64
End: 2024-04-26

## 2024-04-26 NOTE — TELEPHONE ENCOUNTER
From: Mckayla Trejo  To: Dr. Trudy Suazo  Sent: 4/26/2024 10:41 AM EDT  Subject: Taking Advil     Hi Dr LAWTON   I stopped taking any opioids a week ago (I was taking them at night only to sleep). And I started taking Advil (200mg) every 6 hours - I take 2. Is it OK to continue doing this ? I skipped a dose the other day (because I thought I was significantly better) and then was very uncomfortable with the pain (it’s really more like stinging with a squeezing vise sensation). But I read about Ibuprofen and also I am over the recommended daily amount and wondering if I am OK to continue. I feel pretty good, like myself. I drink a whole glass of water with it and frequently or will go take a few bites of something because I am aware of the possible stomach problems. What are your thoughts ? Mckayla

## 2024-04-29 NOTE — PROGRESS NOTES
Mckayla Trejo is a 63 y.o. female here for evaluation and management of primary hyperparathyroidism and papillary thyroid cancer    HPI    She was found to have thyroid nodules which were being followed up, thyroid ultrasound in January 2024 showed, which showed 2 nodules, are 2 nodule concerning for possible parathyroid adenoma  Discussed follow-up with calcium and PTH levels which were both elevated consistent with primary hyperparathyroidism    She also underwent FNA of the right thyroid nodule--which was been benign    She went parathyroidectomy with isthmusectomy in March 2024  Intraoperative PTH dropped to 51.8    Final pathology  A. Right parathyroid gland:      Enlarged, hypercellular parathyroid gland confirmed.      Findings are supportive of parathyroid adenoma in the appropriate setting.        B. Resection, portion of thyroid isthmus:      Involved with noninvasive/encapsulated follicular variant of papillary carcinoma; 1.2 cm      Lesion is histologically excised.         C. Excision, lesion left side of face:      Hidrocystoma.      No evidence of malignancy.     Synoptic report ( part B):   Procedure: Partial excision, thyroid isthmus lesion   Tumor focality: Unifocal   Tumor site: Isthmus   Tumor size: 1.2 x 1.1 cm   Histologic type: Papillary carcinoma, follicular variant,   encapsulated\well-demarcated, noninvasive   Mitotic rate: Not applicable   Tumor necrosis: Not applicable   Angiolymphatic involvement: Not applicable   Perineural invasion: Not applicable   Extrathyroidal Extension: Not applicable   Resection margins: All margins are negative for noninvasive encapsulated follicular variant of papillary thyroid carcinoma   Lesion is approximately 1 to 2 mm from closest resection edge   Regional lymph nodes: No lymph nodes are submitted   Distant metastases: Not applicable   Pathologic stage:   Noninvasive encapsulated/well-demarcated follicular variant of papillary   carcinoma; pT1b.   Regional

## 2024-04-30 ENCOUNTER — OFFICE VISIT (OUTPATIENT)
Dept: ENDOCRINOLOGY | Age: 64
End: 2024-04-30
Payer: COMMERCIAL

## 2024-04-30 VITALS
WEIGHT: 181 LBS | OXYGEN SATURATION: 95 % | HEIGHT: 68 IN | TEMPERATURE: 98 F | BODY MASS INDEX: 27.43 KG/M2 | HEART RATE: 87 BPM | SYSTOLIC BLOOD PRESSURE: 127 MMHG | DIASTOLIC BLOOD PRESSURE: 79 MMHG

## 2024-04-30 DIAGNOSIS — E21.0 PRIMARY HYPERPARATHYROIDISM (HCC): ICD-10-CM

## 2024-04-30 DIAGNOSIS — R73.03 PREDIABETES: ICD-10-CM

## 2024-04-30 DIAGNOSIS — C73 PAPILLARY THYROID CARCINOMA (HCC): Primary | ICD-10-CM

## 2024-04-30 PROCEDURE — 99204 OFFICE O/P NEW MOD 45 MIN: CPT | Performed by: STUDENT IN AN ORGANIZED HEALTH CARE EDUCATION/TRAINING PROGRAM

## 2024-05-02 ENCOUNTER — TELEPHONE (OUTPATIENT)
Dept: PRIMARY CARE CLINIC | Age: 64
End: 2024-05-02

## 2024-05-02 ENCOUNTER — PATIENT MESSAGE (OUTPATIENT)
Dept: PRIMARY CARE CLINIC | Age: 64
End: 2024-05-02

## 2024-05-02 NOTE — TELEPHONE ENCOUNTER
Patient dropped off paperwork for pcp's signature for [facility] paperwork for [employer], please call patient @ [phone] when completed will pickup at office; place in pcp's bin

## 2024-05-14 ENCOUNTER — HOSPITAL ENCOUNTER (OUTPATIENT)
Dept: ULTRASOUND IMAGING | Age: 64
Discharge: HOME | End: 2024-05-14
Payer: COMMERCIAL

## 2024-05-14 DIAGNOSIS — C73 PAPILLARY THYROID CARCINOMA (HCC): ICD-10-CM

## 2024-05-14 PROCEDURE — 76536 US EXAM OF HEAD AND NECK: CPT

## 2024-05-30 NOTE — PROGRESS NOTES
2021    Klaus Machuca (:  1960) is a 64 y.o. 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 Zabrina Arceo comes in today to establish care due to change in insurance. 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 Underwent right lumpectomy 2020, radiation completed 2020 Arimidex intolerant hot flashes mental fog, plan tamoxifen fall  5 to 10 years however patient worried about impact on liver. Apparently has not seen oncology since 2020. Patient has chosen to not take tamoxifen apparently out of concern that tamoxifen and systemic Lamisil for her \"hand fungal infection\" taken concurrently would be hard on her liver, apparently unaware of liver function surveillance strategies. She is troubled by burning itching desquamation on her hands. 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 She uses shampoo rather than soap to wash her body, and is using Vaseline on her hands which she finds is helpful. Home overnight oximetry screen/somnography for sleep apnea  did not suggest significant sleep apnea. Sees cardiologist for PSVT PAF, ablated AVNRT, negative exercise sestamibi , on metoprolol under care of Dr. Maritza Palumbo in EP. However she now has appointment with her see EP. Review of Systems   Constitutional: Negative for activity change, appetite change, fatigue and unexpected weight change.    HENT: Negative for dental problem, sinus pain, sore throat and trouble swallowing. Eyes: Negative for pain and visual disturbance. Respiratory: Negative for apnea, cough, chest tightness, shortness of breath and wheezing. Cardiovascular: Negative for chest pain and palpitations. Gastrointestinal: Negative for abdominal pain, blood in stool, constipation, diarrhea, nausea, rectal pain and vomiting. Endocrine: Negative for cold intolerance, heat intolerance, polydipsia, polyphagia and polyuria. Genitourinary: Negative for difficulty urinating, dysuria, flank pain, frequency, hematuria, pelvic pain, urgency, vaginal bleeding and vaginal discharge. Musculoskeletal: Negative for arthralgias, back pain, gait problem, joint swelling, myalgias, neck pain and neck stiffness. Skin: Negative for color change. Neurological: Negative for dizziness, tremors, syncope, speech difficulty, weakness, light-headedness and headaches. Hematological: Negative for adenopathy. Does not bruise/bleed easily. Psychiatric/Behavioral: Negative for agitation, behavioral problems, decreased concentration, sleep disturbance and suicidal ideas. The patient is not nervous/anxious and is not hyperactive. Prior to Visit Medications    Medication Sig Taking?  Authorizing Provider   meclizine (ANTIVERT) 25 MG tablet Take 25 mg by mouth 3 times daily as needed Yes Historical Provider, MD   ondansetron (ZOFRAN) 4 MG tablet Take 4 mg by mouth every 8 hours as needed Yes Historical Provider, MD   metoprolol succinate (TOPROL XL) 25 MG extended release tablet Take 25 mg by mouth daily Yes Historical Provider, MD   MULTIPLE VITAMINS-MINERALS PO Take 1 tablet by mouth daily  Historical Provider, MD   cetirizine (ZYRTEC) 10 MG tablet Take 10 mg by mouth daily  Historical Provider, MD   clobetasol (TEMOVATE) 0.05 % ointment Apply topically 2 times daily  Historical Provider, MD   Calcium 250 MG CAPS Take 250 mg by mouth 2 times daily  Historical Provider, MD   vitamin D 25 MCG (1000 UT) CAPS Take 1 capsule by mouth daily  Historical Provider, MD        Allergies   Allergen Reactions    Morphine And Related Itching       Past Medical History:   Diagnosis Date    Asthma     Cancer ([address]     right breast    Hypertension        Past Surgical History:   Procedure Laterality Date    BREAST LUMPECTOMY Right 02/01/2020    BREAST REDUCTION SURGERY Left 12/28/2020    HYSTERECTOMY, VAGINAL      MYOMECTOMY         Social History     Socioeconomic History    Marital status:      Spouse name: Not on file    Number of children: Not on file    Years of education: Not on file    Highest education level: Not on file   Occupational History    Not on file   Tobacco Use    Smoking status: Never Smoker    Smokeless tobacco: Never Used   Vaping Use    Vaping Use: Never used   Substance and Sexual Activity    Alcohol use: Never    Drug use: Never    Sexual activity: Not Currently     Partners: Male   Other Topics Concern    Not on file   Social History Narrative    Not on file     Social Determinants of Health     Financial Resource Strain: Low Risk     Difficulty of Paying Living Expenses: Not hard at all   Food Insecurity: No Food Insecurity    Worried About [address] in the Last Year: Never true    [address] in the Last Year: Never true   Transportation Needs:     Lack of Transportation (Medical):      Lack of Transportation (Non-Medical):    Physical Activity:     Days of Exercise per Week:     Minutes of Exercise per Session:    Stress:     Feeling of Stress :    Social Connections:     Frequency of Communication with Friends and Family:     Frequency of Social Gatherings with Friends and Family:     Attends Judaism Services:     Active Member of Clubs or Organizations:     Attends Club or Organization Meetings:     Marital Status:    Intimate Partner Violence:     Fear of Current or Ex-Partner:     Emotionally Abused:     Physically Abused:     Sexually Abused:         Family History   Problem Relation Age of Onset    Arthritis Mother     Diabetes Father     Heart Disease Father        Vitals:    07/29/21 0814   BP: 106/70   Pulse: 66   Temp: 98.2 °F (36.8 °C)   SpO2: 98%   Weight: 183 lb 3.2 oz (83.1 kg)   Height: 5' 8\" (1.727 m)     Estimated body mass index is 27.86 kg/m² as calculated from the following:    Height as of this encounter: 5' 8\" (1.727 m). Weight as of this encounter: 183 lb 3.2 oz (83.1 kg). PHYSICAL EXAM  GENERAL:  Pleasant  female who looks her stated age, awake alert and oriented x3, no acute distress. SKIN: Mild scaling on the hands primarily palmar some extension into intertriginous areas. Mild tinea pedis. No worrisome lesions, skin a little dry. PSYCH:  No psychomotor retardation or agitation. Good eye contact. Unrestricted affect range. Mood congruent with affect. Linear thought. LABS  Lab Review   No results found for any previous visit. ASSESSMENT/PLAN  1. Rash  Mildly dermatitic. Patient believes Lamisil for 6 weeks barely starts to treat her rash. Saw dermatologist a year ago who prescribed high potency topical steroid which patient never took apparently had a concern that the dermatologist explained that if it was fungal it would worsen the rash. I suggested that patient see a dermatologist who can obtain KOH fungal culture contact dermatitis evaluation prior to undertaking course of treatment. Asked to stop using shampoo on her hands and instead use oil of Olay or Dove unscented, with CeraVe moisturizing cream.  - Marcus Rodriguez MD, Dermatology, [facility]    2. Malignant neoplasm of right breast in female, estrogen receptor positive, unspecified site of breast ([facility] [address]  Refer to new medical oncologist where surveillance LFTs will hopefully permit her to comfort in proceeding with appropriate tamoxifen therapy for 5 to 10 years.   - ED LINDA - Bryn Cardenas., Kari Devries, Oncology, [facility]  - Basic Metabolic Panel; Future  - CBC Auto Differential; Future    3. Routine adult health maintenance  Update labs. Encouraged to obtain second Shingrix vaccination. TDap 2014, pneumococcal not indicated, hep C screen 2019 -. Encouraged to obtain second Shingrix. [facility] 2021. Inquire as to last Pap colonoscopy. - TSH with Reflex; Future  - Lipid Panel; Future  - AST; Future  - ALT; Future  - Vitamin D 25 Hydroxy; Future  - Basic Metabolic Panel; Future  - CBC Auto Differential; Future  - HIV Screen; Future  - Hemoglobin A1C; Future    4. Bunion of great toe of left foot  We will soon start to need bunion pads but no skin breakdown just redness at this point. Mild bunion on the contralateral right great toe. 5. PSVT (paroxysmal supraventricular tachycardia) (HCC)  Apparently structurally normal heart, negative sestamibi, apparent history of PAF as well. Wonder about baby aspirin. 6. Vitamin D deficiency  Update level. Discuss DEXA scan at follow-up      Return in about 2 weeks (around 8/12/2021). It was a pleasure to visit with Ms. Robby Baer today. Answered all questions as best I could.   Katy Casillas MD   Time 32 minutes

## 2024-05-31 ENCOUNTER — OFFICE VISIT (OUTPATIENT)
Dept: ENT CLINIC | Age: 64
End: 2024-05-31

## 2024-05-31 VITALS
WEIGHT: 183 LBS | HEART RATE: 89 BPM | RESPIRATION RATE: 16 BRPM | SYSTOLIC BLOOD PRESSURE: 135 MMHG | HEIGHT: 68 IN | DIASTOLIC BLOOD PRESSURE: 88 MMHG | BODY MASS INDEX: 27.74 KG/M2 | TEMPERATURE: 97.7 F

## 2024-05-31 DIAGNOSIS — C73 ENCAPSULATED PAPILLARY CARCINOMA OF THYROID (HCC): ICD-10-CM

## 2024-05-31 DIAGNOSIS — D35.1 PARATHYROID ADENOMA: Primary | ICD-10-CM

## 2024-05-31 PROCEDURE — 99024 POSTOP FOLLOW-UP VISIT: CPT | Performed by: OTOLARYNGOLOGY

## 2024-05-31 NOTE — PROGRESS NOTES
daily 90 tablet 1    MULTIPLE VITAMINS-MINERALS PO Take 1 tablet by mouth daily      vitamin D 25 MCG (1000 UT) CAPS Take 1 capsule by mouth daily      gabapentin (NEURONTIN) 300 MG capsule Take 1 capsule by mouth 2 times daily for 14 days. Do not take this medication with pain medications like opioids or oxycodone, as using them together can cause serious secondary effects like sedation, respiratory suppression and death (Patient not taking: Reported on 4/30/2024) 28 capsule 0    hydrocortisone ([employer] CORTISONE MAXIMUM STRENGTH) 1 % cream Apply topically 2 times daily Apply topically 2 times daily.      bacitracin 500 UNIT/GM ophthalmic ointment Place small amount of ointment on left eye incision twice daily for one week 1 each 0    ibuprofen (ADVIL;MOTRIN) 800 MG tablet Take 1 tablet by mouth 3 times daily      meclizine (ANTIVERT) 25 MG tablet Take 1 tablet by mouth 3 times daily as needed (Patient not taking: Reported on 4/4/2024)      ondansetron (ZOFRAN) 4 MG tablet Take 1 tablet by mouth every 8 hours as needed       No current facility-administered medications for this visit.       Review of Systems     Review of Systems      PhysicalExam     Vitals:    05/31/24 1551   BP: 135/88   Pulse: 89   Resp: 16   Temp: 97.7 °F (36.5 °C)       Physical Exam  Incision healing very well.  Minimal scarring    Procedure           Assessment and Plan     1. Parathyroid adenoma  Patient doing well status post parathyroid and thyroid surgery.  No significant complaints or concerns today.  Follow-up with me as needed.  Follow-up endocrinology for monitoring with ultrasound.    2. Encapsulated papillary carcinoma of thyroid (HCC)        Return if symptoms worsen or fail to improve.      [ ] Review/order radiology tests   [ ] Independent interpretation of diagnostic test by another provider  [ ] Discussed case with another provider  [ ] High risk of loss of major body function  [ ] Elective major surgery with risk

## 2024-06-05 DIAGNOSIS — E21.0 PRIMARY HYPERPARATHYROIDISM (HCC): ICD-10-CM

## 2024-06-05 DIAGNOSIS — B02.9 HERPES ZOSTER WITHOUT COMPLICATION: ICD-10-CM

## 2024-06-05 DIAGNOSIS — R73.03 PREDIABETES: ICD-10-CM

## 2024-06-05 DIAGNOSIS — Q82.8 PALMOPLANTAR KERATODERMA: ICD-10-CM

## 2024-06-05 DIAGNOSIS — C73 PAPILLARY THYROID CARCINOMA (HCC): ICD-10-CM

## 2024-06-05 LAB
25(OH)D3 SERPL-MCNC: 48.9 NG/ML
ALBUMIN SERPL-MCNC: 4.1 G/DL (ref 3.4–5)
ALBUMIN/GLOB SERPL: 1.6 {RATIO} (ref 1.1–2.2)
ALP SERPL-CCNC: 88 U/L (ref 40–129)
ALT SERPL-CCNC: 23 U/L (ref 10–40)
ANION GAP SERPL CALCULATED.3IONS-SCNC: 8 MMOL/L (ref 3–16)
AST SERPL-CCNC: 22 U/L (ref 15–37)
BILIRUB SERPL-MCNC: 0.5 MG/DL (ref 0–1)
BUN SERPL-MCNC: 21 MG/DL (ref 7–20)
CALCIUM SERPL-MCNC: 9.8 MG/DL (ref 8.3–10.6)
CHLORIDE SERPL-SCNC: 100 MMOL/L (ref 99–110)
CO2 SERPL-SCNC: 27 MMOL/L (ref 21–32)
CREAT SERPL-MCNC: 0.6 MG/DL (ref 0.6–1.2)
GFR SERPLBLD CREATININE-BSD FMLA CKD-EPI: >90 ML/MIN/{1.73_M2}
GLUCOSE SERPL-MCNC: 116 MG/DL (ref 70–99)
POTASSIUM SERPL-SCNC: 4.2 MMOL/L (ref 3.5–5.1)
PROT SERPL-MCNC: 6.6 G/DL (ref 6.4–8.2)
PTH-INTACT SERPL-MCNC: 36.5 PG/ML (ref 14–72)
SODIUM SERPL-SCNC: 135 MMOL/L (ref 136–145)
TSH SERPL DL<=0.005 MIU/L-ACNC: 1.49 UIU/ML (ref 0.27–4.2)

## 2024-06-05 NOTE — PROGRESS NOTES
[facility] PRE-OPERATIVE INSTRUCTIONS    Day of Procedure:    6/21            Arrival time:       11         Surgery time:1230    Take the following medications with a sip of water:  Follow your MD/Surgeons pre-procedure instructions regarding your medications     Do not eat or drink anything after 12:00 midnight prior to your surgery.  This includes water chewing gum, mints and ice chips.   You may brush your teeth and gargle the morning of your surgery, but do not swallow the water     Please see your family doctor/pediatrician for a history and physical and/or concerning medications.   Bring any test results/reports from your physicians office.   If you are under the care of a heart doctor or specialist doctor, please be aware that you may be asked to them for clearance    You may be asked to stop blood thinners such as Coumadin, Plavix, Fragmin, Lovenox, etc., or any anti-inflammatories such as:  Aspirin, Ibuprofen, Advil, Naproxen prior to your surgery.    We also ask that you stop any OTC medications such as fish oil, vitamin E, glucosamine, garlic, Multivitamins, COQ 10, etc.    We ask that you do not smoke 24 hours prior to surgery  We ask that you do not  drink any alcoholic beverages 24 hours prior to surgery     You must make arrangements for a responsible adult to take you home after your surgery.    For your safety you will not be allowed to leave alone or drive yourself home.  Your surgery will be cancelled if you do not have a ride home.     Also for your safety, it is strongly suggested that someone stay with you the first 24 hours after your surgery.     A parent or legal guardian must accompany a child scheduled for surgery and plan to stay at the hospital until the child is discharged.    Please do not bring other children with you.    For your comfort, please wear simple loose fitting clothing to the hospital.  Please do not bring valuables.    Do not wear any make-up or nail polish  on your fingers or toes      For your safety, please do not wear any jewelry or body piercing's on the day of surgery.   All jewelry must be removed.      If you have dentures, they will be removed before going to operating room.    For your convenience, we will provide you with a container.    If you wear contact lenses or glasses, they will be removed, please bring a case for them.     If you have a living will and a durable power of  for healthcare, please bring in a copy.     As part of our patient safety program to minimize surgical site infections, we ask you to do the following:    Please notify your surgeon if you develop any illness between         now and the  day of your surgery.    This includes a cough, cold, fever, sore throat, nausea,         or vomiting, and diarrhea, etc.   Please notify your surgeon if you experience dizziness, shortness         of breath or blurred vision between now and the time of your surgery.      Do not shave your operative site 96 hours prior to surgery.   For face and neck surgery, men may use an electric razor 48 hours   prior to surgery.    You may shower the night before surgery or the morning of   your surgery with an antibacterial soap.    You will need to bring a photo ID and insurance card    [facility] has an onsite pharmacy, would you like to utilize our pharmacy     If you will be staying overnight and use a C-pap machine, please bring   your C-pap to hospital     Our goal is to provide you with excellent care, therefore, visitors will be limited to two(2) in the room at a time so that we may focus on providing this care for you.          Please contact pre-admission testing if you have any further questions.                 Brandie Knox phone number:  [phone]     [facility] fax number:  [phone]  Please note these are generalized instructions for all surgical cases, you may be provided with more specific instructions according to your

## 2024-06-05 NOTE — PROGRESS NOTES
Follow Up Prior to Surgery    DOS:   :1960      History and Physical:  Pt to see bebe barnes  for HP    UPDATE: Called Dr Suazo`s office [phone] spoke with Kai , he will notify MD that note needs to be completed and signed. PAT will be called once this completed.    Pt is cleared for sx   See epic

## 2024-06-05 NOTE — PROGRESS NOTES
[facility] Pre-Admission Testing Electronic Communication Worksheet for OR/ENDO Procedures        Patient: Mckayla Trejo    DOS: 6/21    Transportation Confirmed: [x] YES    []  NO    History and Physical:  [] YES    []  NO  [] N/A  If yes, please list doctor or Urgent Care and date of H&P:     Additional Clearance(Cardiac, Pulmonary, etc):  [] YES    [x]  NO    Pre-Admission Testing Visit:  [] YES    []  NO If no, do labs/testing need to be done DOS?  [] YES    []  NO    Medication Reconciliation Complete:  [x] YES    []  NO        Additional Notes:                Interview Complete: [x] YES    []  NO          Marla Rosario RN  12:23 PM

## 2024-06-06 ENCOUNTER — OFFICE VISIT (OUTPATIENT)
Dept: PRIMARY CARE CLINIC | Age: 64
End: 2024-06-06
Payer: COMMERCIAL

## 2024-06-06 VITALS
SYSTOLIC BLOOD PRESSURE: 136 MMHG | DIASTOLIC BLOOD PRESSURE: 78 MMHG | OXYGEN SATURATION: 98 % | WEIGHT: 184 LBS | HEART RATE: 99 BPM | RESPIRATION RATE: 16 BRPM | HEIGHT: 68 IN | TEMPERATURE: 97.2 F | BODY MASS INDEX: 27.89 KG/M2

## 2024-06-06 DIAGNOSIS — Z85.3 HX OF BREAST CANCER: ICD-10-CM

## 2024-06-06 DIAGNOSIS — B02.9 HERPES ZOSTER WITHOUT COMPLICATION: ICD-10-CM

## 2024-06-06 DIAGNOSIS — Q82.8 PALMOPLANTAR KERATODERMA: ICD-10-CM

## 2024-06-06 DIAGNOSIS — E78.2 HYPERLIPIDEMIA, MIXED: ICD-10-CM

## 2024-06-06 DIAGNOSIS — I47.19 AVNRT (AV NODAL RE-ENTRY TACHYCARDIA) (HCC): ICD-10-CM

## 2024-06-06 DIAGNOSIS — R22.41 SUBCUTANEOUS NODULE OF RIGHT FOOT: Primary | ICD-10-CM

## 2024-06-06 LAB
ANA SER QL IA: NEGATIVE
EST. AVERAGE GLUCOSE BLD GHB EST-MCNC: 122.6 MG/DL
HBA1C MFR BLD: 5.9 %
HIV 1+2 AB+HIV1 P24 AG SERPL QL IA: NORMAL
HIV 2 AB SERPL QL IA: NORMAL
HIV1 AB SERPL QL IA: NORMAL
HIV1 P24 AG SERPL QL IA: NORMAL

## 2024-06-06 PROCEDURE — 99214 OFFICE O/P EST MOD 30 MIN: CPT | Performed by: FAMILY MEDICINE

## 2024-06-06 RX ORDER — HYDROCODONE BITARTRATE AND ACETAMINOPHEN 5; 325 MG/1; MG/1
1 TABLET ORAL EVERY 8 HOURS PRN
Qty: 9 TABLET | Refills: 0 | Status: SHIPPED | OUTPATIENT
Start: 2024-06-06 | End: 2024-06-09

## 2024-06-06 RX ORDER — METOPROLOL SUCCINATE 25 MG/1
25 TABLET, EXTENDED RELEASE ORAL DAILY
Qty: 90 TABLET | Refills: 1 | Status: SHIPPED | OUTPATIENT
Start: 2024-06-06

## 2024-06-06 NOTE — PROGRESS NOTES
metoprolol  -Continue metoprolol XL 25mg daily  Orders:  -     metoprolol succinate (TOPROL XL) 25 MG extended release tablet; Take 1 tablet by mouth daily, Disp-90 tablet, R-1Normal  5. Hx of breast cancer  Assessment & Plan:  Stable  -Surveillance mammogram 1/17/24 reassuring  -Pt plans to f/u with Dr. Anamaria Youssef at [facility], ref ordered today  Orders:  -     External Referral To Oncology  6. Hyperlipidemia, mixed  Assessment & Plan:  Stable  -Discussed given Fhx ASCVD could consider starting statin- pt declines at this time. Pt taking asa 81mg daily per her preference. Reviewed r/b of aspirin for primary prevention. Pt would like to continue current regimen as is.  -Reviewed nutrition recommendations: limit sugary foods/beverages, limit processed foods, limited starchy/high fat foods, increase lean protein/fruits and vegetables, Med diet  -Repeat in 6-12 months, if worsened consider statin       Trudy Suazo MD  Electronically signed by Trudy Suazo MD on 6/6/2024 at 2:25 PM.

## 2024-06-07 ENCOUNTER — TELEPHONE (OUTPATIENT)
Dept: PRIMARY CARE CLINIC | Age: 64
End: 2024-06-07

## 2024-06-07 NOTE — TELEPHONE ENCOUNTER
Tricia from [facility] called and said the notes from patient's pre-op are incomplete and unsigned. Please complete and call when they are done. Tricia [phone]

## 2024-06-08 ASSESSMENT — ENCOUNTER SYMPTOMS
COUGH: 0
SHORTNESS OF BREATH: 0

## 2024-06-08 NOTE — ASSESSMENT & PLAN NOTE
Unclear  -Surveillance mammogram 1/17/24 reassuring  -Pt plans to f/u with Dr. Anamaria Youssef at [facility], ref ordered today

## 2024-06-08 NOTE — ASSESSMENT & PLAN NOTE
Improving: s/p antiviral tx and steroid tx  -pain management:  -Ok to take tylenol up to 1000mg three times a day, don't take more than 3 grams in a day. Ok to take ibuprofen OTC unless advised not to per surgeon  -Did not tolerate gabapentin  -Will refill short supple of [place] per pt request, reviewed risks including dependence/addiction, reduced long-term efficacy, sedation, respiratory suppression, overdose, and death, and advised do not take this with other sedating medications like other opioids, gabapentin, or alcohol.   -Ok to use metaderm, aquaphor, vaseline

## 2024-06-08 NOTE — ASSESSMENT & PLAN NOTE
Stable, rate regular and wnl today, pt asx as long as she takes metoprolol  -Continue metoprolol XL 25mg daily

## 2024-06-08 NOTE — ASSESSMENT & PLAN NOTE
Improving: skin scrapings neg for fungus, improved with ammonium lactate and emolients.  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   Per UTD, \"PPK may be a feature of a broad range of acquired skin disorders and is more commonly seen in clinical practice than hereditary PPK. Underlying causes include   -contact dermatitis, chronic hand eczema, psoriasis, pityriasis rubra pilaris  -Keratoderma climactericum - Keratoderma climactericum (Haxthausen's disease) is an acquired keratoderma with painful fissuring described in some postmenopausal women [102,103]. It develops initially on the soles at the pressure points and then becomes confluent. Later, it may extend to the palms. The cause is unknown. Associated factors include obesity and hypertension.  -Exposure to certain drugs or chemicals, such as arsenic and chlorinated hydrocarbons  -Systemic disease, such as myxedema or chronic lymphedema. TSH wnl 6/5/24  -Autoimmune disease: HIV/NOEMI neg 6/5/24  -Mycosis fungoides - Hyperkeratosis of palms and soles may occur in association with, or even precede, mycosis fungoides.   -Internal malignancies - PPK may occur as a paraneoplastic manifestation in a variety of internal cancers, including lung, esophageal, bladder, breast, and colon cancer. Hodgkin lymphoma, leukemia, and breast cancer \"    Only remaining rash  on exam today is mild dry skin on edges of bilat heels and palms, no ulcers, lesions, erythema, or swelling. Most likely etiology is eczema. 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 C/f possible immunosuppression since patient also developed shingles.   -Cont ammonia lactate on heels and emolients  -If recurrent, consider topical steroid for possible eczema  -F/u with

## 2024-06-08 NOTE — ASSESSMENT & PLAN NOTE
Stable  -Discussed given Fhx ASCVD could consider starting statin- pt declines at this time. Pt taking asa 81mg daily per her preference. Reviewed r/b of aspirin for primary prevention. Pt would like to continue current regimen as is.  -Reviewed nutrition recommendations: limit sugary foods/beverages, limit processed foods, limited starchy/high fat foods, increase lean protein/fruits and vegetables, Med diet  -Repeat in 6-12 months, if worsened consider statin

## 2024-06-10 NOTE — TELEPHONE ENCOUNTER
Called [facility] PAT dept to advised ov signed and pt' cleared for surgery.  Faxed ov note to Dr Sahara Sotelo office( f [phone])

## 2024-06-20 ENCOUNTER — ANESTHESIA EVENT (OUTPATIENT)
Dept: OPERATING ROOM | Age: 64
End: 2024-06-20
Payer: COMMERCIAL

## 2024-06-21 ENCOUNTER — ANESTHESIA (OUTPATIENT)
Dept: OPERATING ROOM | Age: 64
End: 2024-06-21
Payer: COMMERCIAL

## 2024-06-21 ENCOUNTER — HOSPITAL ENCOUNTER (OUTPATIENT)
Age: 64
Setting detail: OUTPATIENT SURGERY
Discharge: HOME | End: 2024-06-21
Attending: STUDENT IN AN ORGANIZED HEALTH CARE EDUCATION/TRAINING PROGRAM | Admitting: STUDENT IN AN ORGANIZED HEALTH CARE EDUCATION/TRAINING PROGRAM
Payer: COMMERCIAL

## 2024-06-21 VITALS
HEART RATE: 70 BPM | WEIGHT: 183 LBS | DIASTOLIC BLOOD PRESSURE: 73 MMHG | RESPIRATION RATE: 16 BRPM | HEIGHT: 68 IN | SYSTOLIC BLOOD PRESSURE: 132 MMHG | TEMPERATURE: 97 F | BODY MASS INDEX: 27.74 KG/M2 | OXYGEN SATURATION: 96 %

## 2024-06-21 DIAGNOSIS — R22.41 MASS OF ANKLE, RIGHT: ICD-10-CM

## 2024-06-21 DIAGNOSIS — M79.671 RIGHT FOOT PAIN: ICD-10-CM

## 2024-06-21 DIAGNOSIS — D48.5 NEOPLASM OF UNCERTAIN BEHAVIOR OF SKIN: ICD-10-CM

## 2024-06-21 DIAGNOSIS — M71.379 SYNOVIAL CYST OF ANKLE AND FOOT REGION: ICD-10-CM

## 2024-06-21 PROCEDURE — 2580000003 HC RX 258: Performed by: ANESTHESIOLOGY

## 2024-06-21 PROCEDURE — 6360000002 HC RX W HCPCS: Performed by: STUDENT IN AN ORGANIZED HEALTH CARE EDUCATION/TRAINING PROGRAM

## 2024-06-21 PROCEDURE — 2709999900 HC NON-CHARGEABLE SUPPLY: Performed by: STUDENT IN AN ORGANIZED HEALTH CARE EDUCATION/TRAINING PROGRAM

## 2024-06-21 PROCEDURE — 2580000003 HC RX 258: Performed by: STUDENT IN AN ORGANIZED HEALTH CARE EDUCATION/TRAINING PROGRAM

## 2024-06-21 PROCEDURE — 2500000003 HC RX 250 WO HCPCS: Performed by: ANESTHESIOLOGY

## 2024-06-21 PROCEDURE — 7100000000 HC PACU RECOVERY - FIRST 15 MIN: Performed by: STUDENT IN AN ORGANIZED HEALTH CARE EDUCATION/TRAINING PROGRAM

## 2024-06-21 PROCEDURE — 7100000011 HC PHASE II RECOVERY - ADDTL 15 MIN: Performed by: STUDENT IN AN ORGANIZED HEALTH CARE EDUCATION/TRAINING PROGRAM

## 2024-06-21 PROCEDURE — 7100000001 HC PACU RECOVERY - ADDTL 15 MIN: Performed by: STUDENT IN AN ORGANIZED HEALTH CARE EDUCATION/TRAINING PROGRAM

## 2024-06-21 PROCEDURE — 7100000010 HC PHASE II RECOVERY - FIRST 15 MIN: Performed by: STUDENT IN AN ORGANIZED HEALTH CARE EDUCATION/TRAINING PROGRAM

## 2024-06-21 PROCEDURE — 6370000000 HC RX 637 (ALT 250 FOR IP): Performed by: STUDENT IN AN ORGANIZED HEALTH CARE EDUCATION/TRAINING PROGRAM

## 2024-06-21 PROCEDURE — 2500000003 HC RX 250 WO HCPCS: Performed by: STUDENT IN AN ORGANIZED HEALTH CARE EDUCATION/TRAINING PROGRAM

## 2024-06-21 PROCEDURE — 3600000013 HC SURGERY LEVEL 3 ADDTL 15MIN: Performed by: STUDENT IN AN ORGANIZED HEALTH CARE EDUCATION/TRAINING PROGRAM

## 2024-06-21 PROCEDURE — 3700000000 HC ANESTHESIA ATTENDED CARE: Performed by: STUDENT IN AN ORGANIZED HEALTH CARE EDUCATION/TRAINING PROGRAM

## 2024-06-21 PROCEDURE — 3600000003 HC SURGERY LEVEL 3 BASE: Performed by: STUDENT IN AN ORGANIZED HEALTH CARE EDUCATION/TRAINING PROGRAM

## 2024-06-21 PROCEDURE — 88305 TISSUE EXAM BY PATHOLOGIST: CPT

## 2024-06-21 PROCEDURE — 3700000001 HC ADD 15 MINUTES (ANESTHESIA): Performed by: STUDENT IN AN ORGANIZED HEALTH CARE EDUCATION/TRAINING PROGRAM

## 2024-06-21 PROCEDURE — 2720000010 HC SURG SUPPLY STERILE: Performed by: STUDENT IN AN ORGANIZED HEALTH CARE EDUCATION/TRAINING PROGRAM

## 2024-06-21 PROCEDURE — 6360000002 HC RX W HCPCS: Performed by: NURSE ANESTHETIST, CERTIFIED REGISTERED

## 2024-06-21 PROCEDURE — A4217 STERILE WATER/SALINE, 500 ML: HCPCS | Performed by: STUDENT IN AN ORGANIZED HEALTH CARE EDUCATION/TRAINING PROGRAM

## 2024-06-21 DEVICE — POWDER SURG CELLERATE RX 1 GM HYDROL COLLEGEN: Type: IMPLANTABLE DEVICE | Site: ANKLE | Status: FUNCTIONAL

## 2024-06-21 RX ORDER — PROPOFOL 10 MG/ML
INJECTION, EMULSION INTRAVENOUS CONTINUOUS PRN
Status: DISCONTINUED | OUTPATIENT
Start: 2024-06-21 | End: 2024-06-21 | Stop reason: SDUPTHER

## 2024-06-21 RX ORDER — SODIUM CHLORIDE 0.9 % (FLUSH) 0.9 %
5-40 SYRINGE (ML) INJECTION EVERY 12 HOURS SCHEDULED
Status: DISCONTINUED | OUTPATIENT
Start: 2024-06-21 | End: 2024-06-21 | Stop reason: HOSPADM

## 2024-06-21 RX ORDER — MAGNESIUM HYDROXIDE 1200 MG/15ML
LIQUID ORAL CONTINUOUS PRN
Status: COMPLETED | OUTPATIENT
Start: 2024-06-21 | End: 2024-06-21

## 2024-06-21 RX ORDER — POVIDONE-IODINE 10 MG/G
OINTMENT TOPICAL
Status: COMPLETED | OUTPATIENT
Start: 2024-06-21 | End: 2024-06-21

## 2024-06-21 RX ORDER — SODIUM CHLORIDE 9 MG/ML
INJECTION, SOLUTION INTRAVENOUS PRN
Status: DISCONTINUED | OUTPATIENT
Start: 2024-06-21 | End: 2024-06-21 | Stop reason: HOSPADM

## 2024-06-21 RX ORDER — KETOROLAC TROMETHAMINE 30 MG/ML
INJECTION, SOLUTION INTRAMUSCULAR; INTRAVENOUS PRN
Status: DISCONTINUED | OUTPATIENT
Start: 2024-06-21 | End: 2024-06-21 | Stop reason: SDUPTHER

## 2024-06-21 RX ORDER — LORAZEPAM 2 MG/ML
0.5 INJECTION INTRAMUSCULAR
Status: DISCONTINUED | OUTPATIENT
Start: 2024-06-21 | End: 2024-06-21 | Stop reason: HOSPADM

## 2024-06-21 RX ORDER — LIDOCAINE HYDROCHLORIDE 10 MG/ML
INJECTION, SOLUTION EPIDURAL; INFILTRATION; INTRACAUDAL; PERINEURAL
Status: COMPLETED | OUTPATIENT
Start: 2024-06-21 | End: 2024-06-21

## 2024-06-21 RX ORDER — SODIUM CHLORIDE 0.9 % (FLUSH) 0.9 %
5-40 SYRINGE (ML) INJECTION PRN
Status: DISCONTINUED | OUTPATIENT
Start: 2024-06-21 | End: 2024-06-21 | Stop reason: HOSPADM

## 2024-06-21 RX ORDER — MEPERIDINE HYDROCHLORIDE 25 MG/ML
12.5 INJECTION INTRAMUSCULAR; INTRAVENOUS; SUBCUTANEOUS
Status: DISCONTINUED | OUTPATIENT
Start: 2024-06-21 | End: 2024-06-21 | Stop reason: HOSPADM

## 2024-06-21 RX ORDER — FENTANYL CITRATE 0.05 MG/ML
50 INJECTION, SOLUTION INTRAMUSCULAR; INTRAVENOUS EVERY 5 MIN PRN
Status: DISCONTINUED | OUTPATIENT
Start: 2024-06-21 | End: 2024-06-21 | Stop reason: HOSPADM

## 2024-06-21 RX ORDER — HALOPERIDOL 5 MG/ML
1 INJECTION INTRAMUSCULAR
Status: DISCONTINUED | OUTPATIENT
Start: 2024-06-21 | End: 2024-06-21 | Stop reason: HOSPADM

## 2024-06-21 RX ORDER — NALOXONE HYDROCHLORIDE 0.4 MG/ML
INJECTION, SOLUTION INTRAMUSCULAR; INTRAVENOUS; SUBCUTANEOUS PRN
Status: DISCONTINUED | OUTPATIENT
Start: 2024-06-21 | End: 2024-06-21 | Stop reason: HOSPADM

## 2024-06-21 RX ORDER — SODIUM CHLORIDE 9 MG/ML
INJECTION, SOLUTION INTRAVENOUS CONTINUOUS PRN
Status: DISCONTINUED | OUTPATIENT
Start: 2024-06-21 | End: 2024-06-21 | Stop reason: SDUPTHER

## 2024-06-21 RX ORDER — FENTANYL CITRATE 50 UG/ML
INJECTION, SOLUTION INTRAMUSCULAR; INTRAVENOUS PRN
Status: DISCONTINUED | OUTPATIENT
Start: 2024-06-21 | End: 2024-06-21 | Stop reason: SDUPTHER

## 2024-06-21 RX ORDER — DIPHENHYDRAMINE HYDROCHLORIDE 50 MG/ML
12.5 INJECTION INTRAMUSCULAR; INTRAVENOUS
Status: DISCONTINUED | OUTPATIENT
Start: 2024-06-21 | End: 2024-06-21 | Stop reason: HOSPADM

## 2024-06-21 RX ORDER — LIDOCAINE HYDROCHLORIDE 20 MG/ML
INJECTION, SOLUTION EPIDURAL; INFILTRATION; INTRACAUDAL; PERINEURAL PRN
Status: DISCONTINUED | OUTPATIENT
Start: 2024-06-21 | End: 2024-06-21 | Stop reason: SDUPTHER

## 2024-06-21 RX ORDER — ONDANSETRON 2 MG/ML
4 INJECTION INTRAMUSCULAR; INTRAVENOUS
Status: DISCONTINUED | OUTPATIENT
Start: 2024-06-21 | End: 2024-06-21 | Stop reason: HOSPADM

## 2024-06-21 RX ORDER — MIDAZOLAM HYDROCHLORIDE 1 MG/ML
INJECTION INTRAMUSCULAR; INTRAVENOUS PRN
Status: DISCONTINUED | OUTPATIENT
Start: 2024-06-21 | End: 2024-06-21 | Stop reason: SDUPTHER

## 2024-06-21 RX ADMIN — MIDAZOLAM 1 MG: 1 INJECTION INTRAMUSCULAR; INTRAVENOUS at 13:06

## 2024-06-21 RX ADMIN — FENTANYL CITRATE 50 MCG: 50 INJECTION INTRAMUSCULAR; INTRAVENOUS at 13:12

## 2024-06-21 RX ADMIN — SODIUM CHLORIDE: 9 INJECTION, SOLUTION INTRAVENOUS at 13:03

## 2024-06-21 RX ADMIN — LIDOCAINE HYDROCHLORIDE 60 MG: 20 INJECTION, SOLUTION EPIDURAL; INFILTRATION; INTRACAUDAL; PERINEURAL at 13:12

## 2024-06-21 RX ADMIN — MIDAZOLAM 1 MG: 1 INJECTION INTRAMUSCULAR; INTRAVENOUS at 13:10

## 2024-06-21 RX ADMIN — PROPOFOL 30 MG: 10 INJECTION, EMULSION INTRAVENOUS at 13:14

## 2024-06-21 RX ADMIN — KETOROLAC TROMETHAMINE 30 MG: 30 INJECTION, SOLUTION INTRAMUSCULAR at 13:39

## 2024-06-21 RX ADMIN — PROPOFOL 30 MG: 10 INJECTION, EMULSION INTRAVENOUS at 13:13

## 2024-06-21 RX ADMIN — PROPOFOL 150 MCG/KG/MIN: 10 INJECTION, EMULSION INTRAVENOUS at 13:12

## 2024-06-21 RX ADMIN — SODIUM CHLORIDE 2000 MG: 900 INJECTION INTRAVENOUS at 13:14

## 2024-06-21 ASSESSMENT — PAIN SCALES - GENERAL: PAINLEVEL_OUTOF10: 0

## 2024-06-21 ASSESSMENT — PAIN - FUNCTIONAL ASSESSMENT: PAIN_FUNCTIONAL_ASSESSMENT: NONE - DENIES PAIN

## 2024-06-21 ASSESSMENT — LIFESTYLE VARIABLES: SMOKING_STATUS: 0

## 2024-06-21 NOTE — FLOWSHEET NOTE
Our goal is to have forms completed within 72 hours, however some forms may require a visit or additional information.    What clinic location was the form placed at [facility] or [facility]     Who is the form from?   Where did the form come from? Faxed to clinic   The form was placed in the inbox of TATY Barnett      Please fax to [phone]  Phone number: [phone]    Additional comments: [facility]- Incontinence supply order prescription (pull up prevail XL 16/pk)    Call take on 2/5/2020 at 9:48 AM by Kasey Osorio                                 Pt. Arrived in phase 2.  AOX4.  Denies pain. Text message sent to family.

## 2024-06-21 NOTE — H&P
No change in H&P in past 30 days.     Sahara Sotelo DPM,   [facility]   [address] [place] [address]  [address]  [place], [place] [id number] [phone] office   [phone] fax   [phone] cell  6/21/2024, 12:45 PM

## 2024-06-21 NOTE — DISCHARGE INSTRUCTIONS
Post-Op Discharge Instructions    Fluids and Diet  1. Begin with clear liquids, bouillon, dry toast, soda crackers  2. If NOT nauseated, you may resume a regular diet when you desire    Medications  1. Take prescription medications only as directed  2. If pain is not severe, you may take the non-prescription medication that you normally take.  3. If any side effects or adverse reactions occur, discontinue the medication and contact your doctor.  4. Review the patient drug information leaflet, when provided, before you begin taking the medication    Ambulation and Activity  1. You are advised to go directly home from the hospital  2. Use the prescribed walking aids and cam boot.  3. Weightbearing as tolerated to R foot.  4. Do not lift or move heavy objects  5. Do not Drive    Post Anesthesia Instructions  1. Do not drive or operate machinery  2. Do not consume alcohol, tranquilizers, sleeping medications, or a non-prescription pain medication  3. Do not make important decisions  4. You should have someone home with you tonight    Care of the Operative Site  1. Keep cast or bandage clean, dry, and intact  2. Do not shower  3. Do not remove bandage  4. Elevate Operative extremity as much as possible to reduce swelling and discomfort for the first 72 hours  5. Apply ice bag wrapped in thick towel inside of ankle or behind knee every 20 minutes of every hour for the first 72 hours while awake  6. Do not attempt to put anything between the cast or dressing and skin, some itching is normal    Special Instructions: Call doctor immediately if you develop any of the followin. Fever over 100 degrees by mouth - take your temperature daily  2. Pain not relieved by medication ordered  3. Swelling, increased redness, warmth, or hardness around operative area  4. Numb, tingling or cold toes  5. Toe(s) become white or bluish  6. Bandage becomes wet, soiled, or blood soaked (a small amount of bleeding may be normal)  7.  Increasing or progressive drainage from surgical area    Follow up - Call Dr. Sotelo's office if follow-up appointment is not already scheduled.

## 2024-06-21 NOTE — ANESTHESIA PRE PROCEDURE
Department of Anesthesiology  Preprocedure Note       Name:  Mckayla Trejo   Age:  64 y.o.  :  1960                                          MRN:  [id number]         Date:  2024      Surgeon: Surgeon(s):  Sahara Sotelo DPM    Procedure: Procedure(s):  EXCISION OF CYST RIGHT ANKLE; EXCISION OF SOFT TISSUE MASS ON RIGHT ANKLE, RIGHT EXCISION BENIGN LESION OF VARIOUS SIZES    Medications prior to admission:   Prior to Admission medications    Medication Sig Start Date End Date Taking? Authorizing Provider   metoprolol succinate (TOPROL XL) 25 MG extended release tablet Take 1 tablet by mouth daily 24   Trudy Suazo MD   Lidocaine 3 % CREA Apply 1 g topically 2 times daily as needed (burning pain associated with shingles rash) 3/27/24   Trudy Suazo MD   ammonium lactate (AMLACTIN) 12 % cream Apply 1 Application topically as needed 24   Willow Cooper MD   aspirin 81 MG EC tablet Take 1 tablet by mouth daily    Willow Cooper MD   MULTIPLE VITAMINS-MINERALS PO Take 1 tablet by mouth daily    Willow Cooper MD   vitamin D 25 MCG (1000 UT) CAPS Take 1 capsule by mouth daily    ProviderWillow MD       Current medications:    Current Facility-Administered Medications   Medication Dose Route Frequency Provider Last Rate Last Admin    ceFAZolin (ANCEF) 2,000 mg in sodium chloride 0.9 % 50 mL IVPB (mini-bag)  2,000 mg IntraVENous Once Sahara Sotelo DPM        sodium chloride flush 0.9 % injection 5-40 mL  5-40 mL IntraVENous 2 times per day Bran Johnson MD        sodium chloride flush 0.9 % injection 5-40 mL  5-40 mL IntraVENous PRN Bran Johnson MD        0.9 % sodium chloride infusion   IntraVENous PRN Bran Johnsno MD           Allergies:    Allergies   Allergen Reactions    Other Other (See Comments)     Reaction to cold medications maybe sudafed \"causes wheezes\"\"        Problem List:    Patient Active Problem List   Diagnosis Code

## 2024-06-21 NOTE — PROGRESS NOTES
Pt arrived to PACU from OR. Pt awake and alert. Right foot dressing C/D/I. Pt denies c/o pain at this time.

## 2024-06-21 NOTE — OP NOTE
Operative Note      Patient: Mckayla Trejo  YOB: 1960  MRN: [id number]    Date of Procedure: 6/21/2024    Pre-Op Diagnosis Codes:     * Mass of ankle, right [R22.41]     * Neoplasm of uncertain behavior of skin [D48.5]     * Synovial cyst of ankle and foot region [M71.379]     * Right foot pain [M79.671]    Post-Op Diagnosis: Post-Op Diagnosis Codes:     * Mass of ankle, right [R22.41]     * Neoplasm of uncertain behavior of skin [D48.5]     * Lesion of sural nerve right foot     * Right foot pain [M79.671]       Procedure(s):  EXCISION OF CYST RIGHT ANKLE    Surgeon(s):  Sahara Sotelo DPM    Assistant:   Surgical Assistant: Casey Wiley    Anesthesia: Monitor Anesthesia Care    Estimated Blood Loss (mL): Minimal    Complications: None    Specimens:   ID Type Source Tests Collected by Time Destination   A : A. RIGHT ankle lesion Tissue Tissue SURGICAL PATHOLOGY Sahara Sotelo DPM 6/21/2024 1328        Implants:  Implant Name Type Inv. Item Serial No.  Lot No. LRB No. Used Action   POWDER SURG CELLERATE RX 1 [facility] HY[facility]OL [facility] [id number]  POWDER SURG CELLERATE RX 1 GM HYDROL [facility]  [employer]  Right 1 Implanted         Drains: * No LDAs found *    Findings:  Infection Present At Time Of Surgery (PATOS) (choose all levels that have infection present):  No infection present  Other Findings: Mass was a lesion of the sural nerve  This procedure was not performed to treat primary cutaneous melanoma through wide local excision    Detailed Description of Procedure:   Patient was brought to the operating suite and placed on the operating table in the supine position.  Once monitored anesthesia care was administered a local regional block was placed just proximal to the right lateral ankle utilizing a total of 8 cc of one half Marcaine plain 1/2% and one half lidocaine plain 1%.  At this time ample padding was placed around the patient's right calf followed by a tourniquet.  Then the  right lower extremity was prepped and draped in normal aseptic fashion.  A timeout was held confirming the patient, procedure, laterality, allergies and risk factors and was agreed upon by all.  At this time an Esmarch was utilized to exsanguinate the right lower extremity and the tourniquet was inflated to 250 mmHg and the following procedure was performed.    Utilizing a #15 blade a longitudinal incision was made overlying the lateral malleolus parallel to skin lines where the lesion of question was located.  This incision was approximately 3 cm long.  The incision was deepened through subcutaneous tissue with sharp means only and immediately the mass was encountered.  The mass was carefully dissected utilizing a 15 blade and tenotomy's revealing a perfectly round hard somewhat clear mass that was continuous with the sural nerve both proximally and distally.  Inspection of the mass found that the sural nerve ran directly through the middle of the mass and it was not possible to dissect the mass off of the sural nerve.  At this time decision was made to transect the sural nerve and the mass was sent to pathology for biopsy.  All venous tributaries that were encountered in this process were ligated with electrocautery.  The site was then flushed with normal saline.  At this time an additional 1.5 cc of lidocaine plain 1% was administered just proximal to the incision along the course of the sural nerve for added comfort.  Cellerate was placed in the surgical site.  Subcutaneous tissue was closed with 4-0 Vicryl and dermis was closed with 4-0 Vicryl as well in a running subcuticular fashion.  At this time the tourniquet was let down for a total of 19 minutes with a prompt hyperemic response to the right lower extremity and all digits.    The right foot was then dressed with Steri-Strips, Betadine ointment, iodoform, gauze, Kerlix, Coban.  Patient tolerated the procedure very well and was then transported back to the

## 2024-06-21 NOTE — ANESTHESIA POSTPROCEDURE EVALUATION
Department of Anesthesiology  Postprocedure Note    Patient: Mckayla Trejo  MRN: [id number]  YOB: 1960  Date of evaluation: 6/21/2024    Procedure Summary       Date: 06/21/24 Room / Location: [address]    Anesthesia Start: 1303 Anesthesia Stop: 1355    Procedure: EXCISION OF CYST RIGHT ANKLE (Right: Ankle) Diagnosis:       Mass of ankle, right      Neoplasm of uncertain behavior of skin      Synovial cyst of ankle and foot region      Right foot pain      (Mass of ankle, right [R22.41])      (Neoplasm of uncertain behavior of skin [D48.5])      (Synovial cyst of ankle and foot region [M71.379])      (Right foot pain [M79.671])    Surgeons: Sahara Sotelo DPM Responsible Provider: Leland Houston MD    Anesthesia Type: MAC ASA Status: 3            Anesthesia Type: No value filed.    Cierra Phase I: Cierra Score: 10    Cierra Phase II: Cierra Score: 10    Anesthesia Post Evaluation    Patient location during evaluation: PACU  Level of consciousness: awake and alert  Airway patency: patent  Nausea & Vomiting: no nausea and no vomiting  Cardiovascular status: blood pressure returned to baseline  Respiratory status: acceptable  Hydration status: euvolemic  Comments: Postoperative Anesthesia Note    Name:    Mckayla Trejo  MRN:      [id number]    Patient Vitals in the past 12 hrs:  06/21/24 1415, BP:(!) 121/58, Temp:97 °F (36.1 °C), Temp src:Temporal, Pulse:67, Resp:16, SpO2:92 %  06/21/24 1403, Pulse:66, Resp:12, SpO2:99 %  06/21/24 1400, BP:118/65, Pulse:70, Resp:15, SpO2:97 %  06/21/24 1355, BP:113/61, Pulse:73, Resp:12, SpO2:100 %  06/21/24 1354, Pulse:75, Resp:15, SpO2:99 %  06/21/24 1349, BP:(!) 110/59, Temp:97 °F (36.1 °C), Temp src:Temporal, Pulse:82, Resp:12, SpO2:96 %  06/21/24 1129, BP:(!) 143/83, Temp:97.9 °F (36.6 °C), Temp src:Temporal, Pulse:68, Resp:16, SpO2:99 %     LABS:    CBC  Lab Results       Component                Value               Date/Time

## 2024-07-30 ENCOUNTER — OFFICE VISIT (OUTPATIENT)
Dept: ENDOCRINOLOGY | Age: 64
End: 2024-07-30
Payer: COMMERCIAL

## 2024-07-30 VITALS
DIASTOLIC BLOOD PRESSURE: 71 MMHG | TEMPERATURE: 98 F | HEART RATE: 80 BPM | BODY MASS INDEX: 27.58 KG/M2 | HEIGHT: 68 IN | SYSTOLIC BLOOD PRESSURE: 119 MMHG | OXYGEN SATURATION: 98 % | WEIGHT: 182 LBS

## 2024-07-30 DIAGNOSIS — E21.0 PRIMARY HYPERPARATHYROIDISM (HCC): ICD-10-CM

## 2024-07-30 DIAGNOSIS — C73 PAPILLARY THYROID CARCINOMA (HCC): Primary | ICD-10-CM

## 2024-07-30 DIAGNOSIS — M85.80 OSTEOPENIA, UNSPECIFIED LOCATION: ICD-10-CM

## 2024-07-30 PROCEDURE — 99214 OFFICE O/P EST MOD 30 MIN: CPT | Performed by: STUDENT IN AN ORGANIZED HEALTH CARE EDUCATION/TRAINING PROGRAM

## 2024-07-30 NOTE — PROGRESS NOTES
Mckayla Trejo is a 64 y.o. female here forf.u  of primary hyperparathyroidism and papillary thyroid cancer    No interim events    HPI    She was found to have thyroid nodules which were being followed up, thyroid ultrasound in January 2024 showed, which showed 2 nodules, are 2 nodule concerning for possible parathyroid adenoma   calcium and PTH levels  both elevated consistent with primary hyperparathyroidism    She also underwent FNA of the right thyroid nodule--which was benign    She went parathyroidectomy with isthmusectomy in March 2024  Intraoperative PTH dropped to 51.8    Final pathology  A. Right parathyroid gland:      Enlarged, hypercellular parathyroid gland confirmed.      Findings are supportive of parathyroid adenoma in the appropriate setting.        B. Resection, portion of thyroid isthmus:      Involved with noninvasive/encapsulated follicular variant of papillary carcinoma; 1.2 cm      Lesion is histologically excised.        Synoptic report ( part B):   Procedure: Partial excision, thyroid isthmus lesion   Tumor focality: Unifocal   Tumor site: Isthmus   Tumor size: 1.2 x 1.1 cm   Histologic type: Papillary carcinoma, follicular variant,   encapsulated\well-demarcated, noninvasive   Mitotic rate: Not applicable   Tumor necrosis: Not applicable   Angiolymphatic involvement: Not applicable   Perineural invasion: Not applicable   Extrathyroidal Extension: Not applicable   Resection margins: All margins are negative for noninvasive encapsulated follicular variant of papillary thyroid carcinoma   Lesion is approximately 1 to 2 mm from closest resection edge   Regional lymph nodes: No lymph nodes are submitted   Distant metastases: Not applicable   Pathologic stage:   Noninvasive encapsulated/well-demarcated follicular variant of papillary   carcinoma; pT1b.   Regional lymph nodes: pNX     Most recent labs in June 2024, showed -normalization of total calcium, PTH 36.5, TSH 1.49 and vitamin D

## 2024-08-23 ENCOUNTER — HOSPITAL ENCOUNTER (OUTPATIENT)
Dept: CT IMAGING | Age: 64
Discharge: HOME | End: 2024-08-23
Payer: COMMERCIAL

## 2024-08-23 ENCOUNTER — HOSPITAL ENCOUNTER (OUTPATIENT)
Dept: NUCLEAR MEDICINE | Age: 64
Discharge: HOME | End: 2024-08-23
Payer: COMMERCIAL

## 2024-08-23 DIAGNOSIS — C50.311 MALIGNANT NEOPLASM OF LOWER-INNER QUADRANT OF RIGHT FEMALE BREAST, UNSPECIFIED ESTROGEN RECEPTOR STATUS (HCC): ICD-10-CM

## 2024-08-23 LAB
PERFORMED ON: NORMAL
POC CREATININE: 0.7 MG/DL (ref 0.6–1.2)
POC SAMPLE TYPE: NORMAL

## 2024-08-23 PROCEDURE — A9503 TC99M MEDRONATE: HCPCS | Performed by: INTERNAL MEDICINE

## 2024-08-23 PROCEDURE — 74177 CT ABD & PELVIS W/CONTRAST: CPT

## 2024-08-23 PROCEDURE — 6360000004 HC RX CONTRAST MEDICATION: Performed by: INTERNAL MEDICINE

## 2024-08-23 PROCEDURE — 3430000000 HC RX DIAGNOSTIC RADIOPHARMACEUTICAL: Performed by: INTERNAL MEDICINE

## 2024-08-23 PROCEDURE — 78306 BONE IMAGING WHOLE BODY: CPT | Performed by: INTERNAL MEDICINE

## 2024-08-23 PROCEDURE — 82565 ASSAY OF CREATININE: CPT

## 2024-08-23 RX ORDER — TC 99M MEDRONATE 20 MG/10ML
25 INJECTION, POWDER, LYOPHILIZED, FOR SOLUTION INTRAVENOUS
Status: COMPLETED | OUTPATIENT
Start: 2024-08-23 | End: 2024-08-23

## 2024-08-23 RX ADMIN — TC 99M MEDRONATE 25 MILLICURIE: 20 INJECTION, POWDER, LYOPHILIZED, FOR SOLUTION INTRAVENOUS at 08:58

## 2024-08-23 RX ADMIN — DIATRIZOATE MEGLUMINE AND DIATRIZOATE SODIUM 12 ML: 660; 100 LIQUID ORAL; RECTAL at 09:14

## 2024-08-23 RX ADMIN — IOPAMIDOL 75 ML: 755 INJECTION, SOLUTION INTRAVENOUS at 09:15

## 2024-12-11 ENCOUNTER — HOSPITAL ENCOUNTER (OUTPATIENT)
Dept: GENERAL RADIOLOGY | Age: 64
Discharge: HOME | End: 2024-12-11
Payer: COMMERCIAL

## 2024-12-11 DIAGNOSIS — E21.0 PRIMARY HYPERPARATHYROIDISM (HCC): ICD-10-CM

## 2024-12-11 PROCEDURE — 77080 DXA BONE DENSITY AXIAL: CPT

## 2024-12-26 ENCOUNTER — TELEPHONE (OUTPATIENT)
Dept: ENDOCRINOLOGY | Age: 64
End: 2024-12-26

## 2024-12-26 DIAGNOSIS — M85.80 OSTEOPENIA, UNSPECIFIED LOCATION: ICD-10-CM

## 2024-12-26 DIAGNOSIS — C73 PAPILLARY THYROID CARCINOMA (HCC): Primary | ICD-10-CM

## 2024-12-26 NOTE — TELEPHONE ENCOUNTER
----- Message from Dr. Jacinta Márquez MD sent at 12/26/2024  3:46 PM EST -----  Pl inform the pt   Bone density shows osteopenia-- advise to check fasting labs before 9 am and 24 hr urine test before next appt in February   Remind to get Neck US before appt     Thank you

## 2024-12-26 NOTE — TELEPHONE ENCOUNTER
LVM Pl inform the pt   Bone density shows osteopenia-- advise to check fasting labs before 9 am and 24 hr urine test before next appt in February   Remind to get Neck US before appt

## 2025-01-07 ENCOUNTER — OFFICE VISIT (OUTPATIENT)
Dept: ENT CLINIC | Age: 65
End: 2025-01-07
Payer: COMMERCIAL

## 2025-01-07 VITALS
DIASTOLIC BLOOD PRESSURE: 77 MMHG | WEIGHT: 181 LBS | TEMPERATURE: 97.3 F | BODY MASS INDEX: 27.43 KG/M2 | HEART RATE: 80 BPM | SYSTOLIC BLOOD PRESSURE: 128 MMHG | RESPIRATION RATE: 16 BRPM | HEIGHT: 68 IN

## 2025-01-07 DIAGNOSIS — H60.61 CHRONIC OTITIS EXTERNA OF RIGHT EAR, UNSPECIFIED TYPE: Primary | ICD-10-CM

## 2025-01-07 PROCEDURE — 99212 OFFICE O/P EST SF 10 MIN: CPT | Performed by: OTOLARYNGOLOGY

## 2025-01-07 RX ORDER — HYDROCORTISONE AND ACETIC ACID 20.75; 10.375 MG/ML; MG/ML
3 SOLUTION AURICULAR (OTIC) 2 TIMES DAILY
Qty: 10 ML | Refills: 1 | Status: SHIPPED | OUTPATIENT
Start: 2025-01-07 | End: 2025-01-17

## 2025-01-08 NOTE — PROGRESS NOTES
FOLLOW UP VISIT:    CHIEF COMPLAINT: Otorrhea.    INTERIM HISTORY: Seen by me 1 year ago with some flaking in both ear canals.  At the time I removed cerumen from both canals.  She has no problems on the left now but has continued flaking on the right.    PAST MEDICAL HISTORY:   Social History     Tobacco Use   Smoking Status Former    Types: Cigarettes    Passive exposure: Past   Smokeless Tobacco Never                                                    Social History     Substance and Sexual Activity   Alcohol Use Never                                                    Current Outpatient Medications:     acetic acid-hydrocortisone (VOSOL-HC) 1-2 % otic solution, Place 3 drops into the right ear 2 times daily for 10 days, Disp: 10 mL, Rfl: 1    metoprolol succinate (TOPROL XL) 25 MG extended release tablet, Take 1 tablet by mouth daily, Disp: 90 tablet, Rfl: 1    ammonium lactate (AMLACTIN) 12 % cream, Apply 1 Application topically as needed, Disp: , Rfl:     aspirin 81 MG EC tablet, Take 1 tablet by mouth three times a week, Disp: , Rfl:     MULTIPLE VITAMINS-MINERALS PO, Take 1 tablet by mouth daily, Disp: , Rfl:     vitamin D 25 MCG (1000 UT) CAPS, Take 1 capsule by mouth daily, Disp: , Rfl:                                                  Past Medical History:   Diagnosis Date    Asthma     Breast cancer (HCC)     Cancer (HCC)     right breast    History of blood transfusion     1997 \"maybe ivigg infusin for travel\"    History of therapeutic radiation     Hx of smoking     Hypertension     Parathyroid adenoma     SVT (supraventricular tachycardia) (HCC)     ablation 11/2017    Wears glasses     reading                                                    Past Surgical History:   Procedure Laterality Date    BREAST LUMPECTOMY Right 02/01/2020    BREAST REDUCTION SURGERY Left 12/28/2020    CARDIAC ELECTROPHYSIOLOGY STUDY AND ABLATION      2017    CONDYLOMA EXCISION Left 3/18/2024    EXCISION BENIGN NEOPLASM SKIN

## 2025-01-13 ENCOUNTER — TELEMEDICINE (OUTPATIENT)
Dept: PRIMARY CARE CLINIC | Age: 65
End: 2025-01-13
Payer: COMMERCIAL

## 2025-01-13 DIAGNOSIS — U07.1 COVID-19: Primary | ICD-10-CM

## 2025-01-13 PROCEDURE — 99214 OFFICE O/P EST MOD 30 MIN: CPT | Performed by: FAMILY MEDICINE

## 2025-01-13 RX ORDER — ACETAMINOPHEN 500 MG
500 TABLET ORAL EVERY 6 HOURS PRN
Qty: 20 TABLET | Refills: 0 | Status: SHIPPED | OUTPATIENT
Start: 2025-01-13 | End: 2025-01-18

## 2025-01-13 SDOH — ECONOMIC STABILITY: FOOD INSECURITY: WITHIN THE PAST 12 MONTHS, THE FOOD YOU BOUGHT JUST DIDN'T LAST AND YOU DIDN'T HAVE MONEY TO GET MORE.: NEVER TRUE

## 2025-01-13 SDOH — ECONOMIC STABILITY: FOOD INSECURITY: WITHIN THE PAST 12 MONTHS, YOU WORRIED THAT YOUR FOOD WOULD RUN OUT BEFORE YOU GOT MONEY TO BUY MORE.: NEVER TRUE

## 2025-01-13 ASSESSMENT — PATIENT HEALTH QUESTIONNAIRE - PHQ9
2. FEELING DOWN, DEPRESSED OR HOPELESS: NOT AT ALL
SUM OF ALL RESPONSES TO PHQ9 QUESTIONS 1 & 2: 0
SUM OF ALL RESPONSES TO PHQ QUESTIONS 1-9: 0
SUM OF ALL RESPONSES TO PHQ QUESTIONS 1-9: 0
1. LITTLE INTEREST OR PLEASURE IN DOING THINGS: NOT AT ALL
SUM OF ALL RESPONSES TO PHQ QUESTIONS 1-9: 0
SUM OF ALL RESPONSES TO PHQ QUESTIONS 1-9: 0

## 2025-01-13 NOTE — PROGRESS NOTES
Mckayla Trejo, was evaluated through a synchronous (real-time) audio-video encounter. The patient (or guardian if applicable) is aware that this is a billable service, which includes applicable co-pays. This Virtual Visit was conducted with patient's (and/or legal guardian's) consent. Patient identification was verified, and a caregiver was present when appropriate.   The patient was located at Home: [address] [place] [address]  Provider was located at Facility (Appt Dept): [address] [place] [address]  Confirm you are appropriately licensed, registered, or certified to deliver care in the state where the patient is located as indicated above. If you are not or unsure, please re-schedule the visit: Yes, I confirm.     Mckayla Trejo (:  1960) is a Established patient, presenting virtually for evaluation of the following:      Below is the assessment and plan developed based on review of pertinent history, physical exam, labs, studies, and medications.     Assessment & Plan  COVID-19   New, not at goal (unstable), begin antiviral treatment. If cost is prohibited use coupon card or call patient assistance line for Paxlovid      Orders:    nirmatrelvir/ritonavir 300/100 (PAXLOVID) 20 x 150 MG & 10 x 100MG TBPK; Take as directed for 5 days    acetaminophen (TYLENOL) 500 MG tablet; Take 1 tablet by mouth every 6 hours as needed for Fever or Pain      Return in 2 weeks (on 2025) for establish care - 30 min.       Subjective   1.13.25 - new pt to establish, prev pt of Dr. Suazo  Virtual visit    + COVID this am, symptoms began 3 days ago.  Fever, sore throat, congestion.  No cough  - will send in paxlovid, tylenol.       Hyperlipidemia:  LDL Goal < 100  Statin: tolerating well  - no myalgias  - Low cholesterol diet  - Lipid panel annually  - continue current statin    Prediabetes      [phone]    Positive For Covid-19      Review of Systems       Objective

## 2025-01-13 NOTE — PATIENT INSTRUCTIONS
sickroom.  Have the person wear a high-quality mask around other people. This includes when anyone is in the room with them or if they leave their room (for example, to go to the bathroom).  Don't share personal items. These include dishes, cups, towels, and bedding.  Wash your hands often and well. Use soap and water, and scrub for at least 20 seconds. This is especially important after you've been around the sick person or touched things they've touched. If soap and water aren't handy, use an alcohol-based hand .  Wear a high-quality mask when caring for someone who is sick. And wear a mask when you're around other people after you've cared for someone who's sick. You should also get tested for COVID. You may need more than one test. If you test positive, take precautions to avoid spreading the virus to other people.  Improve airflow. If you have to spend time indoors with others, open windows and doors. Or you can use a fan to blow air away from people and out a window.  Avoid touching your mouth, nose, and eyes.  Take care with the person's laundry. It's okay to wash the sick person's laundry with yours. If you have them, wear disposable gloves when handling their dirty laundry, and wash your hands well after you touch it. Wash items in the warmest water allowed for the fabric type, and dry them completely.  Clean high-touch items every day and anytime the sick person touches them. These include doorknobs, light switches, toilets, counters, and remote controls. Use a household disinfectant or a homemade bleach solution. (Follow the directions on the label.) If the sick person has their own room, have them disinfect it every day.  Avoid having visitors. If you have to have visitors, everyone needs to wear a mask. And keep the visit as short as possible. To help protect family and friends, stay in touch with them only by phone or computer.  Check the CDC website at cdc.gov for the most current

## 2025-01-17 DIAGNOSIS — M85.80 OSTEOPENIA, UNSPECIFIED LOCATION: ICD-10-CM

## 2025-01-17 LAB
25(OH)D3 SERPL-MCNC: 47.2 NG/ML
ALBUMIN SERPL-MCNC: 4.5 G/DL (ref 3.4–5)
ALBUMIN/GLOB SERPL: 1.7 {RATIO} (ref 1.1–2.2)
ALP SERPL-CCNC: 79 U/L (ref 40–129)
ALT SERPL-CCNC: 26 U/L (ref 10–40)
ANION GAP SERPL CALCULATED.3IONS-SCNC: 9 MMOL/L (ref 3–16)
AST SERPL-CCNC: 23 U/L (ref 15–37)
BILIRUB SERPL-MCNC: 0.5 MG/DL (ref 0–1)
BUN SERPL-MCNC: 17 MG/DL (ref 7–20)
CALCIUM SERPL-MCNC: 10 MG/DL (ref 8.3–10.6)
CHLORIDE SERPL-SCNC: 102 MMOL/L (ref 99–110)
CO2 SERPL-SCNC: 29 MMOL/L (ref 21–32)
CREAT SERPL-MCNC: 0.8 MG/DL (ref 0.6–1.2)
GFR SERPLBLD CREATININE-BSD FMLA CKD-EPI: 82 ML/MIN/{1.73_M2}
GLUCOSE SERPL-MCNC: 109 MG/DL (ref 70–99)
POTASSIUM SERPL-SCNC: 4.8 MMOL/L (ref 3.5–5.1)
PROT SERPL-MCNC: 7.1 G/DL (ref 6.4–8.2)
SODIUM SERPL-SCNC: 140 MMOL/L (ref 136–145)

## 2025-01-18 DIAGNOSIS — M85.80 OSTEOPENIA, UNSPECIFIED LOCATION: ICD-10-CM

## 2025-01-21 LAB — COLLAGEN CTX SERPL-MCNC: 353 PG/ML

## 2025-02-17 ENCOUNTER — OFFICE VISIT (OUTPATIENT)
Dept: ENDOCRINOLOGY | Age: 65
End: 2025-02-17
Payer: COMMERCIAL

## 2025-02-17 ENCOUNTER — TELEPHONE (OUTPATIENT)
Dept: ENT CLINIC | Age: 65
End: 2025-02-17

## 2025-02-17 VITALS
HEART RATE: 67 BPM | BODY MASS INDEX: 27.74 KG/M2 | DIASTOLIC BLOOD PRESSURE: 77 MMHG | SYSTOLIC BLOOD PRESSURE: 145 MMHG | RESPIRATION RATE: 16 BRPM | HEIGHT: 68 IN | WEIGHT: 183 LBS

## 2025-02-17 DIAGNOSIS — C73 PAPILLARY THYROID CARCINOMA (HCC): Primary | ICD-10-CM

## 2025-02-17 DIAGNOSIS — E21.0 PRIMARY HYPERPARATHYROIDISM: ICD-10-CM

## 2025-02-17 DIAGNOSIS — M85.80 OSTEOPENIA, UNSPECIFIED LOCATION: ICD-10-CM

## 2025-02-17 DIAGNOSIS — R73.03 PREDIABETES: ICD-10-CM

## 2025-02-17 LAB — HBA1C MFR BLD: 6.1 %

## 2025-02-17 PROCEDURE — 3044F HG A1C LEVEL LT 7.0%: CPT | Performed by: STUDENT IN AN ORGANIZED HEALTH CARE EDUCATION/TRAINING PROGRAM

## 2025-02-17 PROCEDURE — 83036 HEMOGLOBIN GLYCOSYLATED A1C: CPT | Performed by: STUDENT IN AN ORGANIZED HEALTH CARE EDUCATION/TRAINING PROGRAM

## 2025-02-17 PROCEDURE — 99214 OFFICE O/P EST MOD 30 MIN: CPT | Performed by: STUDENT IN AN ORGANIZED HEALTH CARE EDUCATION/TRAINING PROGRAM

## 2025-02-17 RX ORDER — VITAMIN B COMPLEX
1 CAPSULE ORAL DAILY
COMMUNITY

## 2025-02-17 NOTE — PROGRESS NOTES
36.5, TSH 1.49 and vitamin D 48.9    Ultrasound neck-in May 2024-showed no concerning lymph nodes, subcentimeter nodules bilaterally      Labs-  January 2024  TSH 1.98  .9  Total calcium 10.5  Ionized calcium 1.55      Imaging studies  BMD December 2024  LUMBAR SPINE: L1-L4BMD: 0.866 g/zk1xS-zkxse: -1.6  LEFT TOTAL HIP:BMD: 0.854 g/cs9lY-artjy: -0.7  LEFT FEMORAL NECK:BMD: 0.582 g/oj9fO-wtdhh: -2.4  RIGHT TOTAL HIP:BMD: 0.835 g/cm2 T-score: -0.9  RIGHT FEMORAL NECK:BMD: 0.589 g/cm2 T-score: -2.3  LEFT FOREARM (RADIUS 1/3)BMD: 0.561 g/cm2 T-score: -2.2    FRAX 10-YEAR PROBABILITY OF FRACTURE:  10-year fracture risk is performed using the [employer] FRAX  calculator based on patient-reported risk factors.  Major osteoporotic fracture: 22%  Hip fracture: 2.3%    Thyroid ultrasound January 2024  Right thyroid lobe:  Measures 5.4 x 1.4 x 2.0 cm  Left thyroid lobe:  Measures 4.5 x 1.4 x 1.3 cm  Isthmus:  Measures 3.6 mm  Thyroid Gland:  Thyroid gland demonstrates normal echotexture and vascularity.  Nodules: There are two right-sided nodules, as follows:  NODULE: Right 1  Size: 16 x 12 x 7 mm  Location: Medial right thyroid lobe   ACR TI-RADS risk category: TR4  NODULE: Right 2  Size: 21 x 16 x 10 mm   Location: Along the posterior margin of the right thyroid lobe   ACR TI-RADS risk category: TR4     Cervical lymphadenopathy: No abnormal lymph nodes in the imaged portions of the neck.    Parathyroid SPECT-CT February 2024    Parathyroid adenoma posterior to the mid to upper right thyroid lobe.   Right thyroid nodule with mild uptake corresponding to the nodule seen on the recent thyroid ultrasound.     Thyroid ultrasound May 2024  1. Intermittent right parathyroidectomy and resection a nodule near the  junction of the thyroid right lobe and isthmus.  No findings to suggest  disease recurrence at the operative site.  2. No findings suggestive of metastatic cervical lymphadenopathy.  3. Bilateral TI-RADS

## 2025-02-17 NOTE — TELEPHONE ENCOUNTER
Pt would like previous for script ear drops resent to pharmacy. She wasn't to pick it up and it was sent back.

## 2025-02-18 DIAGNOSIS — H60.61 CHRONIC OTITIS EXTERNA OF RIGHT EAR, UNSPECIFIED TYPE: Primary | ICD-10-CM

## 2025-02-18 RX ORDER — HYDROCORTISONE AND ACETIC ACID 20.75; 10.375 MG/ML; MG/ML
3 SOLUTION AURICULAR (OTIC) 2 TIMES DAILY
Qty: 10 ML | Refills: 2 | Status: SHIPPED | OUTPATIENT
Start: 2025-02-18 | End: 2025-02-28

## 2025-02-19 ENCOUNTER — HOSPITAL ENCOUNTER (OUTPATIENT)
Dept: ULTRASOUND IMAGING | Age: 65
Discharge: HOME | End: 2025-02-19
Payer: COMMERCIAL

## 2025-02-19 DIAGNOSIS — C73 PAPILLARY THYROID CARCINOMA (HCC): ICD-10-CM

## 2025-02-19 PROCEDURE — 76536 US EXAM OF HEAD AND NECK: CPT

## 2025-03-23 DIAGNOSIS — I47.19 AVNRT (AV NODAL RE-ENTRY TACHYCARDIA): ICD-10-CM

## 2025-03-24 RX ORDER — METOPROLOL SUCCINATE 25 MG/1
25 TABLET, EXTENDED RELEASE ORAL DAILY
Qty: 90 TABLET | Refills: 1 | Status: SHIPPED | OUTPATIENT
Start: 2025-03-24

## 2025-03-24 NOTE — TELEPHONE ENCOUNTER
Medication:   Requested Prescriptions     Pending Prescriptions Disp Refills    metoprolol succinate (TOPROL XL) 25 MG extended release tablet [Pharmacy Med Name: METOPROLOL ER SUCCINATE 25MG TABS] 90 tablet 1     Sig: TAKE 1 TABLET BY MOUTH DAILY        Last Filled:      Patient Phone Number: [phone] (home)     Last appt: 1/13/2025   Next appt: Visit date not found    Last OARRS:       3/18/2024    12:54 PM   RX Monitoring   Acute Pain Prescriptions Severe pain not adequately treated with lower dose.

## 2025-06-13 ENCOUNTER — OFFICE VISIT (OUTPATIENT)
Dept: PRIMARY CARE CLINIC | Age: 65
End: 2025-06-13

## 2025-06-13 VITALS
SYSTOLIC BLOOD PRESSURE: 136 MMHG | HEIGHT: 68 IN | BODY MASS INDEX: 27.74 KG/M2 | DIASTOLIC BLOOD PRESSURE: 64 MMHG | TEMPERATURE: 97.6 F | HEART RATE: 86 BPM | OXYGEN SATURATION: 95 % | WEIGHT: 183 LBS

## 2025-06-13 DIAGNOSIS — Z85.72 HX OF LYMPHOMA: ICD-10-CM

## 2025-06-13 DIAGNOSIS — L85.3 DRY SKIN DERMATITIS: ICD-10-CM

## 2025-06-13 DIAGNOSIS — R30.0 DYSURIA: Primary | ICD-10-CM

## 2025-06-13 DIAGNOSIS — B02.29 POST HERPETIC NEURALGIA: ICD-10-CM

## 2025-06-13 PROBLEM — C88.40 MALT LYMPHOMA (HCC): Status: RESOLVED | Noted: 2024-01-14 | Resolved: 2025-06-13

## 2025-06-13 LAB
BACTERIA URNS QL MICRO: ABNORMAL /HPF
BILIRUB UR QL STRIP.AUTO: NEGATIVE
CALCIUM OXALATE CRYSTALS: PRESENT
CLARITY UR: ABNORMAL
COLOR UR: YELLOW
EPI CELLS #/AREA URNS AUTO: 4 /HPF (ref 0–5)
GLUCOSE UR STRIP.AUTO-MCNC: NEGATIVE MG/DL
HGB UR QL STRIP.AUTO: ABNORMAL
HYALINE CASTS #/AREA URNS AUTO: 4 /LPF (ref 0–8)
KETONES UR STRIP.AUTO-MCNC: ABNORMAL MG/DL
LEUKOCYTE ESTERASE UR QL STRIP.AUTO: ABNORMAL
NITRITE UR QL STRIP.AUTO: NEGATIVE
PH UR STRIP.AUTO: 5.5 [PH] (ref 5–8)
PROT UR STRIP.AUTO-MCNC: NEGATIVE MG/DL
RBC CLUMPS #/AREA URNS AUTO: 2 /HPF (ref 0–4)
SP GR UR STRIP.AUTO: 1.03 (ref 1–1.03)
UA COMPLETE W REFLEX CULTURE PNL UR: YES
UA DIPSTICK W REFLEX MICRO PNL UR: YES
URN SPEC COLLECT METH UR: ABNORMAL
UROBILINOGEN UR STRIP-ACNC: 0.2 E.U./DL
WBC #/AREA URNS AUTO: 27 /HPF (ref 0–5)

## 2025-06-13 RX ORDER — GABAPENTIN 100 MG/1
CAPSULE ORAL
Qty: 354 CAPSULE | Refills: 0 | Status: SHIPPED | OUTPATIENT
Start: 2025-06-13 | End: 2025-07-27

## 2025-06-13 RX ORDER — PHENAZOPYRIDINE HYDROCHLORIDE 200 MG/1
200 TABLET, FILM COATED ORAL 3 TIMES DAILY PRN
Qty: 12 TABLET | Refills: 0 | Status: SHIPPED | OUTPATIENT
Start: 2025-06-13 | End: 2025-06-16

## 2025-06-13 RX ORDER — UREA 40 %
CREAM (GRAM) TOPICAL
Qty: 120 G | Refills: 0 | Status: SHIPPED | OUTPATIENT
Start: 2025-06-13

## 2025-06-13 ASSESSMENT — PATIENT HEALTH QUESTIONNAIRE - PHQ9
1. LITTLE INTEREST OR PLEASURE IN DOING THINGS: NOT AT ALL
SUM OF ALL RESPONSES TO PHQ QUESTIONS 1-9: 0
2. FEELING DOWN, DEPRESSED OR HOPELESS: NOT AT ALL
SUM OF ALL RESPONSES TO PHQ QUESTIONS 1-9: 0

## 2025-06-13 NOTE — PROGRESS NOTES
Mckayla Trejo (:  1960) is a 65 y.o. 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     SUBJECTIVE:  6..25:    Stinging of her urine, just started a little while ago  Was in thad, dark urine, starteda month ago.  But it is not improving  Noticing some mucus in her urine  No flank pain, no fevers, no chills    Subjective:  - Anamika presents with urinary symptoms including stinging during urination for approximately one month. The stinging has been persistent but low-level, without the severe symptoms she has heard other women describe. She reports that this morning was the first time she urinated without stinging, though it returned later in the day.  - Anamika denies blood in urine but has noticed mucus in her urine for some time, describing it as \"mucous stuff\" visible in the toilet bowl.  - She denies back pain, fevers, chills, or feeling run down. She recalls feeling terrible for a couple of hours when symptoms first appeared a month ago.  - Anamika reports her urine was initially a brownish-yellow color, which has gradually returned to normal recently. She notes that her urine is typically \"highlighter yellow\" due to daily vitamin intake.    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 The lesion appeared after a mosquito bite that never resolved. It was surgically removed with good margins, and Anamika declined recommended radiation therapy. No recurrence reported.  - History of thyroid condition (unspecified).  - History of two kidney stones related to thyroid condition.  - Current issue with dry, irritated skin on both feet for approximately two years, which began during a vacation when her feet got wet and she walked for hours. She has tried various treatments including vinegar and is currently using polysporin with bandages.

## 2025-06-14 ENCOUNTER — PATIENT MESSAGE (OUTPATIENT)
Dept: PRIMARY CARE CLINIC | Age: 65
End: 2025-06-14

## 2025-06-15 LAB — BACTERIA UR CULT: NORMAL

## 2025-06-16 DIAGNOSIS — R31.9 HEMATURIA, UNSPECIFIED TYPE: Primary | ICD-10-CM

## 2025-06-23 ENCOUNTER — HOSPITAL ENCOUNTER (OUTPATIENT)
Dept: CT IMAGING | Age: 65
Discharge: HOME | End: 2025-06-23
Attending: STUDENT IN AN ORGANIZED HEALTH CARE EDUCATION/TRAINING PROGRAM
Payer: COMMERCIAL

## 2025-06-23 DIAGNOSIS — R31.9 HEMATURIA, UNSPECIFIED TYPE: ICD-10-CM

## 2025-06-23 LAB
PERFORMED ON: NORMAL
POC CREATININE: 0.9 MG/DL (ref 0.6–1.2)
POC SAMPLE TYPE: NORMAL

## 2025-06-23 PROCEDURE — 74178 CT ABD&PLV WO CNTR FLWD CNTR: CPT | Performed by: STUDENT IN AN ORGANIZED HEALTH CARE EDUCATION/TRAINING PROGRAM

## 2025-06-23 PROCEDURE — 6360000004 HC RX CONTRAST MEDICATION: Performed by: STUDENT IN AN ORGANIZED HEALTH CARE EDUCATION/TRAINING PROGRAM

## 2025-06-23 PROCEDURE — 82565 ASSAY OF CREATININE: CPT

## 2025-06-23 RX ORDER — IOPAMIDOL 755 MG/ML
120 INJECTION, SOLUTION INTRAVASCULAR
Status: COMPLETED | OUTPATIENT
Start: 2025-06-23 | End: 2025-06-23

## 2025-06-23 RX ADMIN — IOPAMIDOL 120 ML: 755 INJECTION, SOLUTION INTRAVENOUS at 15:26

## 2025-06-24 ENCOUNTER — RESULTS FOLLOW-UP (OUTPATIENT)
Dept: PRIMARY CARE CLINIC | Age: 65
End: 2025-06-24

## 2025-06-30 DIAGNOSIS — I47.19 AVNRT (AV NODAL RE-ENTRY TACHYCARDIA): ICD-10-CM

## 2025-06-30 RX ORDER — METOPROLOL SUCCINATE 25 MG/1
25 TABLET, EXTENDED RELEASE ORAL DAILY
Qty: 90 TABLET | Refills: 1 | Status: SHIPPED | OUTPATIENT
Start: 2025-06-30

## 2025-06-30 NOTE — TELEPHONE ENCOUNTER
Medication:   Requested Prescriptions     Pending Prescriptions Disp Refills    metoprolol succinate (TOPROL XL) 25 MG extended release tablet [Pharmacy Med Name: METOPROLOL ER SUCCINATE 25MG TABS] 90 tablet 1     Sig: TAKE 1 TABLET BY MOUTH DAILY        Last Filled:      Patient Phone Number: [phone] (home)     Last appt: 6/13/2025   Next appt: Visit date not found    Last OARRS:       3/18/2024    12:54 PM   RX Monitoring   Acute Pain Prescriptions Severe pain not adequately treated with lower dose.

## 2025-07-26 DIAGNOSIS — B02.29 POST HERPETIC NEURALGIA: ICD-10-CM

## 2025-07-28 RX ORDER — GABAPENTIN 100 MG/1
300 CAPSULE ORAL 3 TIMES DAILY
Qty: 810 CAPSULE | Refills: 0 | Status: SHIPPED | OUTPATIENT
Start: 2025-07-28 | End: 2025-10-26

## 2025-07-28 NOTE — TELEPHONE ENCOUNTER
Medication:   Requested Prescriptions     Pending Prescriptions Disp Refills    gabapentin (NEURONTIN) 100 MG capsule [Pharmacy Med Name: GABAPENTIN 100MG CAPSULES] 354 capsule 0     Sig: TAKE 2 CAPSULES BY MOUTH THREE TIMES DAILY FOR 14 DAYS THEN TAKE 3 CAPSULES BY MOUTH THREE TIMES DAILY FOR 30 DAYS        Last Filled:      Patient Phone Number: [phone] (home)     Last appt: 6/13/2025   Next appt: Visit date not found    Last OARRS:       3/18/2024    12:54 PM   RX Monitoring   Acute Pain Prescriptions Severe pain not adequately treated with lower dose.

## (undated) DEVICE — DRAPE,TOWEL,LARGE,INVISISHIELD: Brand: MEDLINE

## (undated) DEVICE — SYRINGE IRRIG 60ML SFT PLIABLE BLB EZ TO GRP 1 HND USE W/

## (undated) DEVICE — PODIATRY: Brand: MEDLINE INDUSTRIES, INC.

## (undated) DEVICE — STRIP,CLOSURE,WOUND,MEDI-STRIP,1/4X3: Brand: MEDLINE

## (undated) DEVICE — 3M™ COBAN™ NL STERILE NON-LATEX SELF-ADHERENT WRAP, 2084S, 4 IN X 5 YD (10 CM X 4,5 M), 18 ROLLS/CASE: Brand: 3M™ COBAN™

## (undated) DEVICE — GLOVE SURG SZ 7 L12IN FNGR THK79MIL GRN LTX FREE

## (undated) DEVICE — 1010 S-DRAPE TOWEL DRAPE 10/BX: Brand: STERI-DRAPE™

## (undated) DEVICE — E-Z CLEAN, NON-STICK, PTFE COATED, ELECTROSURGICAL NEEDLE ELECTRODE, MODIFIED EXTENDED INSULATION, 2.75 INCH (7 CM): Brand: MEGADYNE

## (undated) DEVICE — SUTURE CHROMIC GUT SZ 3-0 L27IN ABSRB BRN L26MM SH 1/2 CIR G122H

## (undated) DEVICE — BLADE ES ELASTOMERIC COAT INSUL DURABLE BEND UPTO 90DEG

## (undated) DEVICE — TOWEL,STOP FLAG GOLD N-W: Brand: MEDLINE

## (undated) DEVICE — STANDARD HYPODERMIC NEEDLE,POLYPROPYLENE HUB: Brand: MONOJECT

## (undated) DEVICE — SUTURE VICRYL + SZ 5-0 L18IN ABSRB UD L19MM PS-2 3/8 CIR PRIM VCP495H

## (undated) DEVICE — DRAPE,INSTRUMENT,MAGNETIC,10X16: Brand: MEDLINE

## (undated) DEVICE — ELECTRODE PT RET AD L9FT HI MOIST COND ADH HYDRGEL CORDED

## (undated) DEVICE — GLOVE SURG SZ 7 CRM LTX FREE POLYISOPRENE POLYMER BEAD ANTI

## (undated) DEVICE — SOLUTION IV 1000ML 0.9% SOD CHL

## (undated) DEVICE — HEAD & NECK: Brand: MEDLINE INDUSTRIES, INC.

## (undated) DEVICE — GLOVE ORANGE PI 7 1/2   MSG9075

## (undated) DEVICE — BANDAGE,GAUZE,BULKEE II,4.5"X4.1YD,STRL: Brand: MEDLINE

## (undated) DEVICE — BLADE OPHTH 180DEG CUT SURF BLU STR SHRP DBL BVL GRINDLESS

## (undated) DEVICE — GOWN SIRUS NONREIN XL W/TWL: Brand: MEDLINE INDUSTRIES, INC.

## (undated) DEVICE — PADDING,UNDERCAST,COTTON, 4"X4YD STERILE: Brand: MEDLINE

## (undated) DEVICE — SUTURE VICRYL + SZ 4-0 L27IN ABSRB UD L26MM SH 1/2 CIR VCP415H

## (undated) DEVICE — TRANSFER SET 3": Brand: MEDLINE INDUSTRIES, INC.

## (undated) DEVICE — Z DISC USE 2257676 SUTURE CHROMIC GUT SZ 4-0 L18IN ABSRB BRN L13MM P-3 3/8 CIR 1654G

## (undated) DEVICE — TUBE SUCT LAPSCP

## (undated) DEVICE — SUTURE PERMAHAND SZ 3-0 L18IN NONABSORBABLE BLK SILK BRAID A184H

## (undated) DEVICE — BLANKET WRM W40.2XL55.9IN IORT LO BODY + MISTRAL AIR

## (undated) DEVICE — DRESSING,GAUZE,XEROFORM,CURAD,1"X8",ST: Brand: CURAD

## (undated) DEVICE — LIQUIBAND RAPID ADHESIVE 36/CS 0.8ML: Brand: MEDLINE

## (undated) DEVICE — MERCY HEALTH WEST TURNOVER: Brand: MEDLINE INDUSTRIES, INC.

## (undated) DEVICE — SUTURE PERMA-HAND SZ 2-0 L30IN NONABSORBABLE BLK L26MM SH K833H

## (undated) DEVICE — SHEARS ENDOSCP L9CM CRV HARM FOCS +

## (undated) DEVICE — STOCKINETTE,IMPERVIOUS,12X48,STERILE: Brand: MEDLINE

## (undated) DEVICE — TOWEL,OR,DSP,ST,BLUE,DLX,8/PK,10PK/CS: Brand: MEDLINE

## (undated) DEVICE — BANDAGE GZ W4INXL4YD 2 PLY WVN RL